# Patient Record
Sex: FEMALE | Race: WHITE | Employment: OTHER | URBAN - METROPOLITAN AREA
[De-identification: names, ages, dates, MRNs, and addresses within clinical notes are randomized per-mention and may not be internally consistent; named-entity substitution may affect disease eponyms.]

---

## 2017-01-05 ENCOUNTER — HOSPITAL ENCOUNTER (OUTPATIENT)
Dept: PHYSICAL THERAPY | Age: 77
Discharge: HOME | End: 2017-01-05
Attending: FAMILY MEDICINE
Payer: MEDICARE

## 2017-01-05 NOTE — PROGRESS NOTES
Patient did not receive PT this PM due to patient cancelled due to sick.  Brittaney Sosa, PT  1/5/2017

## 2017-01-16 ENCOUNTER — HOSPITAL ENCOUNTER (OUTPATIENT)
Dept: PHYSICAL THERAPY | Age: 77
Discharge: HOME | End: 2017-01-16
Attending: FAMILY MEDICINE
Payer: MEDICARE

## 2017-01-16 PROCEDURE — 97112 NEUROMUSCULAR REEDUCATION: CPT

## 2017-01-16 PROCEDURE — G8979 MOBILITY GOAL STATUS: HCPCS

## 2017-01-16 PROCEDURE — 97110 THERAPEUTIC EXERCISES: CPT

## 2017-01-16 PROCEDURE — G8978 MOBILITY CURRENT STATUS: HCPCS

## 2017-01-26 ENCOUNTER — HOSPITAL ENCOUNTER (OUTPATIENT)
Dept: PHYSICAL THERAPY | Age: 77
Discharge: HOME | End: 2017-01-26
Attending: FAMILY MEDICINE
Payer: MEDICARE

## 2017-01-26 PROCEDURE — 97112 NEUROMUSCULAR REEDUCATION: CPT

## 2017-01-26 PROCEDURE — 97110 THERAPEUTIC EXERCISES: CPT

## 2017-01-26 NOTE — PROGRESS NOTES
Luke Stephania   (ORE:[birth year]) [facility] Center at  [facility]  [address], [address] [place] [address],[address], [address] [facility]  [phone]   [phone]         Outpatient PHYSICAL THERAPY: Daily Note 1/26/2017  Fall Risk Score: 2 (5+ = High Risk)    ICD-10: Treatment Diagnosis: other abnormalities of gait and mobility (R26.89); Dizziness and giddiness R42  REFERRING PHYSICIAN: Martin Burleson, *  MD Orders: eval and treat  Return Physician Appointment:   MEDICAL/REFERRING DIAGNOSIS: R42 Dizziness  DATE OF ONSET: dizziness for several years   PRIOR LEVEL OF FUNCTION: independent  PRECAUTIONS/ALLERGIES:   Allergies   Allergen Reactions    Sulfa (Sulfonamide Antibiotics) Rash     ASSESSMENT:  ?????? ? ? This section established at most recent assessment??????????  1/16/17: Patient has attended 3 PT sessions from 11/30/16 to 1/16/17 for imbalance, unsteady gait, and episodes of dizziness. Patient came for appointments on 11/30/16, and 12/7/16, and now returns on 1/16/17. Patient is doing well with balance and mobility, reporting no falls. Her scores are improving with balance testing. Patient would benefit from continuing PT to address these problems to improve patient's independence and safety with mobility and daily activities. Thank you. PROBLEM LIST (Impairments causing functional limitations):  1. Decreased Strength affecting function  2. Decreased Activity tolerance    3. Imbalance  4. Unsteady gait  5. dizziness  GOALS: (Goals have been discussed and agreed upon with patient.)  SHORT-TERM FUNCTIONAL GOALS: Time Frame: 1-2 weeks  1. Patient will demonstrate independence and compliance with home exercise program to improve balance and strength for daily activities. met  2. DISCHARGE GOALS: Time Frame: 8-12 weeks  1. Patient will increase her score on the Lai Balance Scale to greater than or equal to 50/56 indicating improved safety and decreased fall risk for daily activities. MET  2. Patient will increase her score on the dynamic gait index to greater than or equal to 21/24 indicating improved balance and safety for community ambulation. Progressing and ongoing  3. Patient will report decreased dizziness to less than or equal to 1/10 with daily activities to improve safety and quality of life. Progressing and ongoing  4. Patient will increase bilateral lower extremity strength to greater than or equal to 4+/5 to improve strength for functional mobility activities. Progressing and ongoing  5. Patient will be able to step up/down a 6 inch curb safely and independently to improve mobility for daily activities. Progressing and ongoing  REHABILITATION POTENTIAL FOR STATED GOALS: Simba Mention OF CARE:  INTERVENTIONS PLANNED: (Benefits and precautions of physical therapy have been discussed with the patient.)  1. balance exercise  2. home exercise program (HEP)  3. neuromuscular re-education/strengthening  4. therapeutic activities  5. therapeutic exercise/strengthening  6. transfer training  7. Habituation and vestibular training  TREATMENT PLAN EFFECTIVE DATES: 11/30/2016 TO 2/22/2017  FREQUENCY/DURATION: Continue to follow patient 1-2 times a week for 8-12 weeks to address above goals. SUBJECTIVE:    History of Present Injury/Illness (Reason for Referral): Problem with my balance and get really dizzy sometimes. To have gallbladder surgery in the next few weeks, to be scheduled. Need handrail for steps and fell down steps one time. Last fall was many years ago, but has had a few near falls more frequently, usually from tripping. Swimmy headed feeling. Flare ups of dizziness come and go. 2 times a few weeks ago, lasting less than an hour; sat down to rest and it went away. Vision: extremely near sighted and wear contacts; hearing: WNL. Need to go to ENT but haven't been there yet. Nothing triggers the dizziness. No headaches. Does drive. Lives alone. Does gardening in warmer months.  Does grocery shopping. No dizziness with position changes in bed. I do want to mention to you that sometimes when I exercise, my O2 sats drop. I don't have O2 anymore, but I do have a sat monitor at home. Present Symptoms: No falls. Doing better. Pain Intensity 1: 0  Dominant Side: right  Past Medical History:   Past Medical History   Diagnosis Date    Adverse effect of anesthesia      \"O2 level dropped during JEANMARIE\"    Aortic stenosis      s/p AVR 2015 with Dr. Lizz Adkins    Asthma      controlled per pt     Atrial fibrillation ([address]      after AVR 2015 for short duration - no evidence of recurrence per card note 16    Chronic sinusitis     COPD (chronic obstructive pulmonary disease) ([address]      Per pulm note 11/15/16: \"Severe COPD now very well controlled on ICS/LABA, LAMA and CLINT (no use in last month, no exacerbations since last seen). Loletta Nunnery Loletta Nunnery \"    Depression     Heart murmur     High cholesterol     Hypertension     Osteoporosis     PVD (peripheral vascular disease) ([address]      per card note 16    Thyroid disease     Tinnitus of both ears     Varicose vein of leg      . Past Surgical History   Procedure Laterality Date    Pr thyroidectomy  1970    Hx urological       kidney stone    Hx  section       x3    Hx aortic valve replacement  May 2015    Hx breast lumpectomy  1970     benign       Current Medications:   Current Outpatient Prescriptions   Medication Sig    benzonatate (TESSALON) 200 mg capsule Take 1 Cap by mouth three (3) times daily as needed for Cough.  pravastatin (PRAVACHOL) 20 mg tablet Take 1 Tab by mouth nightly.  cefdinir (OMNICEF) 300 mg capsule Take 1 Cap by mouth two (2) times a day.  oxyCODONE-acetaminophen (PERCOCET) 5-325 mg per tablet Take 1-2 Tabs by mouth every four (4) hours as needed for Pain. Max Daily Amount: 12 Tabs.  ALBUTEROL IN Take  by inhalation as needed.     CHOLECALCIFEROL, VITAMIN D3, (VITAMIN D3 PO) Take  by mouth daily.    MULTIVITAMIN WITH MINERALS (HAIR,SKIN AND NAILS PO) Take  by mouth daily.  famotidine (PEPCID) 20 mg tablet Take 1 Tab by mouth daily. (Patient taking differently: Take 20 mg by mouth daily. Take / use AM day of surgery  per anesthesia protocols.)    fexofenadine-pseudoephedrine (ALLEGRA-D 24 HOUR) 180-240 mg per tablet Take 1 Tab by mouth daily. (Patient taking differently: Take 1 Tab by mouth daily. Indications: ALLERGIC RHINITIS)    fluticasone-vilanterol (BREO ELLIPTA) 100-25 mcg/dose inhaler Take 1 Puff by inhalation daily. (Patient taking differently: Take 1 Puff by inhalation daily. Take / use AM day of surgery  per anesthesia protocols.)    sertraline (ZOLOFT) 100 mg tablet Take 1 Tab by mouth daily. (Patient taking differently: Take 100 mg by mouth every evening.)    aspirin delayed-release 81 mg tablet Take 81 mg by mouth daily. Take / use AM day of surgery  per anesthesia protocols.  SPIRIVA RESPIMAT 2.5 mcg/actuation mist Take 2 Puffs by inhalation daily. Take / use AM day of surgery  per anesthesia protocols.  carvedilol (COREG) 3.125 mg tablet Take 3.125 mg by mouth two (2) times daily (with meals). Take / use AM day of surgery  per anesthesia protocols.  furosemide (LASIX) 40 mg tablet Take 40 mg by mouth daily as needed. No current facility-administered medications for this encounter. Date Last Reviewed: 1/26/17  Social History/Home Situation: lives alone. A few steps to get in the house, 2 story but does not need to go upstairs often. .     Work/Activity History: retired.   OBJECTIVE:    GROSS PRESENTATION/POSTURE: forward head posture   FUNCTIONAL MOBILITY:  · Bed Mobility: independent    · Transfers: independent    LE STRENGTH:  4/5 hip flexion, 4/5 hip abduction, 4/5 hip adduction, 4/5 hip extension, 4/5 knee extension, 4/5 knee flexion, 4/5 ankle dorsiflexion, 4/5 ankle plantarflexion  SENSATION: intact  POSTURAL CONTROL & BALANCE:  · Lai Balance Scale: 50/56.  (A score less than 45/56 indicates high risk of falls)      Comments: score at initial evaluation was 46/56  · Dynamic Gait Index:  18/24.   (A score less than or equal to 19/24 is abnormal and predictive of falls)      Comments: score at initial evaluation was 17/24  · Smart Equitest Sensory Organization Test:  Objective findings indicate Normal use of somatosensory, Normal use of visual, & Decreased use of vestibular inputs to balance. Center of gravity is shifted anteriorly. See scanned report. · Smart Equitest Limits of Stability Test: NT  · Other Smart Equitest Testing:  NT  OCULOMOTOR EXAM:  · Eye Range of Motion:  full range of motion  · Cervical Range of Motion:   diminished range of motion  · Spontaneous nystagmus:  NO  · Gaze holding nystagmus:  NO  · Skew Deviation:  none  · Smooth Pursuit:      [x]Smooth Eye Movements    []Delayed Eye Movements     []Within Normal Limits     []Other(comment): · Voluntary Saccades:      [x]Smooth Eye Movements    []Delayed Eye Movements     []Within Normal Limits     []Other(comment): · Optokinetic cloth: NT  VESTIBULAR OCULAR REFLEX TESTING:  · Dynamic Visual Acuity Test: NT  · Head Thrust Test: Negative to the right. Negative to the left. · VOR Cancellation: WNL  POSITION TESTS:  · Hallpike-Mantee testing: NT due to patient subjectively reports no dizziness with rolling over or sitting up/lying down. · Motion Sensitivity Quotient: NT  QUALITY OF GAIT: Patient ambulates with no AD, with decreased pace and decreased step length, wide OWEN. OBJECTIVE MEASURES:   Tool Used: Dynamic Gait Index  Score:  Initial: 17/24 Most Recent: 18/24 (Date: 1/16/17 )   Interpretation of Score: Each section is scored on a 0-3 scale, 0 representing the patients inability to perform the task and 3 representing independence. The scores of each section are added together for a total score of 24. Any score below 19 indicates increased risk for falls.   Score 24 23-19 18-15 14-10 9-5 4-1 0   Modifier CH CI CJ CK CL CM CN     ? Mobility - Walking and Moving Around:     - CURRENT STATUS: CJ - 20%-39% impaired, limited or restricted    - GOAL STATUS:  CI - 1%-19% impaired, limited or restricted    - D/C STATUS:  ---------------To be determined---------------    TREATMENT:    (In addition to Assessment/Re-Assessment sessions the following treatments were rendered)        Neuromuscular Re-education (30 Minutes):  Exercise/activities per grid below to improve balance, coordination, posture and proprioception. Required minimal verbal cues to promote static and dynamic balance in standing.   Balance/Vestibular Treatment:   Activity   Date  12/7/16   Date  1/16/17 Date  1/26/17 Date Date Date   Activity/Exercise   Sets/reps/equipment Sets/reps/  equipment Sets/reps/  equipment Sets/reps/  equipment Sets/reps/  equipment Sets/reps/  equipment   Walking with head turns     4 laps in hallway 4 laps in hallway 4 laps in hallway      Walking with head up & down     4 laps in hallway 4 laps in hallway 4 laps in hallway      Step ups     6 inch step x 10 reps each leg with rail  6 inch step x 10 reps each leg with rail      Step taps     6 inch step x 30 reps 6 inch step x 30 reps 6 inch step x 30 reps      Marching   4 laps in hallway 4 laps in hallway 4 laps in hallway      Sidestepping   4 laps in hallway 4 laps in hallway 4 laps in hallway      Step overs   Over 1/2 foam roll x 10 reps each leg Over 1/2 foam roll x 15 reps each leg Over 1/2 foam roll x 15 reps each leg      Dawn           Walking  backwards     4 laps in hallway  4 laps in hallway      Tandem walking           Weaving in/out of cones     4 laps in hallway  4 laps in hallway      Picking up cones             Sports cord             PadProof           Kick ball           Figure 8s            Circles right/left           Walking with 360 degree turns           Spirals           Weight shifting:    Left & Right     Blue foams eyes open Blue foams eyes open Blue foams eyes open      Weight shifting: Forward & Backward      Blue foams eyes open Blue foams eyes open Blue foams eyes open      Static Standing Balance     Blue foams eyes open and closed Blue foams eyes open and closed Blue foams eyes open and closed      Standing with feet apart     Blue foams eyes open with head turns and up/down Blue foams eyes open with head turns and up/down Blue foams eyes open with head turns and up/down      Standing with feet together             Standing with feet semitandem   Blue foams eyes open and closed Blue foams eyes open and closed Blue foams eyes open and closed      Standing with feet tandem           Single leg stance           X1/X2 Viewing exercises             Hallpike-Jose David testing for BPPV (Benign Paroxysmal Positional Vertigo)             Vasquez-Daroff exercises           Canalith Repositioning treatment/Epley Maneuver  for BPPV (Benign Paroxysmal Positional Vertigo)           Smart Equitest Training: See scanned report. Therapeutic Exercise: (10 Minutes):  Exercises per grid below to improve mobility, strength and balance. Required minimal verbal cues to promote proper body alignment, promote proper body posture and promote proper body mechanics. Progressed resistance, repetitions and complexity of movement as indicated. Date:  1/16/17 Date:  1/26/17 Date:     Activity/Exercise Parameters Parameters Parameters   nustep Level 3 x 6 minutes Level 3 x 6 minutes    Sit to stand From mat table x 10 reps From mat table x 10 reps                                      HEP: handouts given to patient for balance exercises. _____________________________________________________  Treatment Assessment:  Patient tolerated exercises without complaints. Continue to progress as tolerated.   Progression/Medical Necessity:   · Patient is expected to demonstrate progress in strength, balance and dizziness to improve safety during daily activities. Compliance with Program/Exercises: compliant  Reason for Continuation of Services/Other Comments:  · Patient continues to demonstrate capacity to improve strength, balance, dizziness which will increase independence and increase safety. Recommendations/Intent for next treatment session: \"Treatment next visit will focus on advancements to more challenging activities\".     Total Treatment Duration:  PT Patient Time In/Time Out  Time In: 1350  Time Out: [address]

## 2017-02-02 ENCOUNTER — HOSPITAL ENCOUNTER (OUTPATIENT)
Dept: PHYSICAL THERAPY | Age: 77
Discharge: HOME | End: 2017-02-02
Attending: FAMILY MEDICINE
Payer: MEDICARE

## 2017-02-02 PROCEDURE — 97112 NEUROMUSCULAR REEDUCATION: CPT

## 2017-02-02 PROCEDURE — 97110 THERAPEUTIC EXERCISES: CPT

## 2017-02-02 NOTE — PROGRESS NOTES
Frankie Kwan   (KDF:[id number]) Therapy Center at  [facility], [address] [address],[address],   [place] [address].  [phone]   [phone]         Outpatient PHYSICAL THERAPY: Daily Note 2/2/2017  Fall Risk Score: 2 (5+ = High Risk)    ICD-10: Treatment Diagnosis: other abnormalities of gait and mobility (R26.89); Dizziness and giddiness R42  REFERRING PHYSICIAN: Teresa Huitron MD Orders: eval and treat  Return Physician Appointment:   MEDICAL/REFERRING DIAGNOSIS: R42 Dizziness  DATE OF ONSET: dizziness for several years   PRIOR LEVEL OF FUNCTION: independent  PRECAUTIONS/ALLERGIES:   Allergies   Allergen Reactions    Sulfa (Sulfonamide Antibiotics) Rash     ASSESSMENT:  ?????? ? ? This section established at most recent assessment??????????  1/16/17: Patient has attended 3 PT sessions from 11/30/16 to 1/16/17 for imbalance, unsteady gait, and episodes of dizziness. Patient came for appointments on 11/30/16, and 12/7/16, and now returns on 1/16/17. Patient is doing well with balance and mobility, reporting no falls. Her scores are improving with balance testing. Patient would benefit from continuing PT to address these problems to improve patient's independence and safety with mobility and daily activities. Thank you. PROBLEM LIST (Impairments causing functional limitations):  1. Decreased Strength affecting function  2. Decreased Activity tolerance    3. Imbalance  4. Unsteady gait  5. dizziness  GOALS: (Goals have been discussed and agreed upon with patient.)  SHORT-TERM FUNCTIONAL GOALS: Time Frame: 1-2 weeks  1. Patient will demonstrate independence and compliance with home exercise program to improve balance and strength for daily activities. met  2. DISCHARGE GOALS: Time Frame: 8-12 weeks  1. Patient will increase her score on the Lai Balance Scale to greater than or equal to 50/56 indicating improved safety and decreased fall risk for daily activities. MET  2. Patient will increase her score on the dynamic gait index to greater than or equal to 21/24 indicating improved balance and safety for community ambulation. Progressing and ongoing  3. Patient will report decreased dizziness to less than or equal to 1/10 with daily activities to improve safety and quality of life. Progressing and ongoing  4. Patient will increase bilateral lower extremity strength to greater than or equal to 4+/5 to improve strength for functional mobility activities. Progressing and ongoing  5. Patient will be able to step up/down a 6 inch curb safely and independently to improve mobility for daily activities. Progressing and ongoing  REHABILITATION POTENTIAL FOR STATED GOALS: [facility] CARE:  INTERVENTIONS PLANNED: (Benefits and precautions of physical therapy have been discussed with the patient.)  1. balance exercise  2. home exercise program (HEP)  3. neuromuscular re-education/strengthening  4. therapeutic activities  5. therapeutic exercise/strengthening  6. transfer training  7. Habituation and vestibular training  TREATMENT PLAN EFFECTIVE DATES: 11/30/2016 TO 2/22/2017  FREQUENCY/DURATION: Continue to follow patient 1-2 times a week for 8-12 weeks to address above goals. SUBJECTIVE:    History of Present Injury/Illness (Reason for Referral): Problem with my balance and get really dizzy sometimes. To have gallbladder surgery in the next few weeks, to be scheduled. Need handrail for steps and fell down steps one time. Last fall was many years ago, but has had a few near falls more frequently, usually from tripping. Swimmy headed feeling. Flare ups of dizziness come and go. 2 times a few weeks ago, lasting less than an hour; sat down to rest and it went away. Vision: extremely near sighted and wear contacts; hearing: WNL. Need to go to ENT but haven't been there yet. Nothing triggers the dizziness. No headaches. Does drive. Lives alone. Does gardening in warmer months.  Does grocery shopping. No dizziness with position changes in bed. I do want to mention to you that sometimes when I exercise, my O2 sats drop. I don't have O2 anymore, but I do have a sat monitor at home. Present Symptoms: \"Doing well. No problems. \"  Pain Intensity 1: 0  Dominant Side: right  Past Medical History:   Past Medical History   Diagnosis Date    Adverse effect of anesthesia      \"O2 level dropped during JEANMARIE\"    Aortic stenosis      s/p AVR 2015 with Dr. Kimmy King    Asthma      controlled per pt     Atrial fibrillation ([address]      after AVR 2015 for short duration - no evidence of recurrence per card note 16    Chronic sinusitis     COPD (chronic obstructive pulmonary disease) ([address]      Per pulm note 11/15/16: \"Severe COPD now very well controlled on ICS/LABA, LAMA and CLINT (no use in last month, no exacerbations since last seen). Rakan Reasoner Rakan Reasoner \"    Depression     Heart murmur     High cholesterol     Hypertension     Osteoporosis     PVD (peripheral vascular disease) ([address]      per card note 16    Thyroid disease     Tinnitus of both ears     Varicose vein of leg      . Past Surgical History   Procedure Laterality Date    Pr thyroidectomy  1970    Hx urological       kidney stone    Hx  section       x3    Hx aortic valve replacement  May 2015    Hx breast lumpectomy  1970     benign       Current Medications:   Current Outpatient Prescriptions   Medication Sig    benzonatate (TESSALON) 200 mg capsule Take 1 Cap by mouth three (3) times daily as needed for Cough.  pravastatin (PRAVACHOL) 20 mg tablet Take 1 Tab by mouth nightly.  cefdinir (OMNICEF) 300 mg capsule Take 1 Cap by mouth two (2) times a day.  oxyCODONE-acetaminophen (PERCOCET) 5-325 mg per tablet Take 1-2 Tabs by mouth every four (4) hours as needed for Pain. Max Daily Amount: 12 Tabs.  ALBUTEROL IN Take  by inhalation as needed.     CHOLECALCIFEROL, VITAMIN D3, (VITAMIN D3 PO) Take  by mouth daily.    MULTIVITAMIN WITH MINERALS (HAIR,SKIN AND NAILS PO) Take  by mouth daily.  famotidine (PEPCID) 20 mg tablet Take 1 Tab by mouth daily. (Patient taking differently: Take 20 mg by mouth daily. Take / use AM day of surgery  per anesthesia protocols.)    fexofenadine-pseudoephedrine (ALLEGRA-D 24 HOUR) 180-240 mg per tablet Take 1 Tab by mouth daily. (Patient taking differently: Take 1 Tab by mouth daily. Indications: ALLERGIC RHINITIS)    fluticasone-vilanterol (BREO ELLIPTA) 100-25 mcg/dose inhaler Take 1 Puff by inhalation daily. (Patient taking differently: Take 1 Puff by inhalation daily. Take / use AM day of surgery  per anesthesia protocols.)    sertraline (ZOLOFT) 100 mg tablet Take 1 Tab by mouth daily. (Patient taking differently: Take 100 mg by mouth every evening.)    aspirin delayed-release 81 mg tablet Take 81 mg by mouth daily. Take / use AM day of surgery  per anesthesia protocols.  SPIRIVA RESPIMAT 2.5 mcg/actuation mist Take 2 Puffs by inhalation daily. Take / use AM day of surgery  per anesthesia protocols.  carvedilol (COREG) 3.125 mg tablet Take 3.125 mg by mouth two (2) times daily (with meals). Take / use AM day of surgery  per anesthesia protocols.  furosemide (LASIX) 40 mg tablet Take 40 mg by mouth daily as needed. No current facility-administered medications for this encounter. Date Last Reviewed: 2/2/17  Social History/Home Situation: lives alone. A few steps to get in the house, 2 story but does not need to go upstairs often. .     Work/Activity History: retired.   OBJECTIVE:    GROSS PRESENTATION/POSTURE: forward head posture   FUNCTIONAL MOBILITY:  · Bed Mobility: independent    · Transfers: independent    LE STRENGTH:  4/5 hip flexion, 4/5 hip abduction, 4/5 hip adduction, 4/5 hip extension, 4/5 knee extension, 4/5 knee flexion, 4/5 ankle dorsiflexion, 4/5 ankle plantarflexion  SENSATION: intact  POSTURAL CONTROL & BALANCE:  · Lai Balance Scale: 50/56.  (A score less than 45/56 indicates high risk of falls)      Comments: score at initial evaluation was 46/56  · Dynamic Gait Index:  18/24.   (A score less than or equal to 19/24 is abnormal and predictive of falls)      Comments: score at initial evaluation was 17/24  · Smart Equitest Sensory Organization Test:  Objective findings indicate Normal use of somatosensory, Normal use of visual, & Decreased use of vestibular inputs to balance. Center of gravity is shifted anteriorly. See scanned report. · Smart Equitest Limits of Stability Test: NT  · Other Smart Equitest Testing:  NT  OCULOMOTOR EXAM:  · Eye Range of Motion:  full range of motion  · Cervical Range of Motion:   diminished range of motion  · Spontaneous nystagmus:  NO  · Gaze holding nystagmus:  NO  · Skew Deviation:  none  · Smooth Pursuit:      [x]Smooth Eye Movements    []Delayed Eye Movements     []Within Normal Limits     []Other(comment): · Voluntary Saccades:      [x]Smooth Eye Movements    []Delayed Eye Movements     []Within Normal Limits     []Other(comment): · Optokinetic cloth: NT  VESTIBULAR OCULAR REFLEX TESTING:  · Dynamic Visual Acuity Test: NT  · Head Thrust Test: Negative to the right. Negative to the left. · VOR Cancellation: WNL  POSITION TESTS:  · Hallpike-Street testing: NT due to patient subjectively reports no dizziness with rolling over or sitting up/lying down. · Motion Sensitivity Quotient: NT  QUALITY OF GAIT: Patient ambulates with no AD, with decreased pace and decreased step length, wide OWEN. OBJECTIVE MEASURES:   Tool Used: Dynamic Gait Index  Score:  Initial: 17/24 Most Recent: 18/24 (Date: 1/16/17 )   Interpretation of Score: Each section is scored on a 0-3 scale, 0 representing the patients inability to perform the task and 3 representing independence. The scores of each section are added together for a total score of 24. Any score below 19 indicates increased risk for falls.   Score 24 23-19 18-15 14-10 9-5 4-1 0   Modifier CH CI CJ CK CL CM CN     ? Mobility - Walking and Moving Around:     - CURRENT STATUS: CJ - 20%-39% impaired, limited or restricted    - GOAL STATUS:  CI - 1%-19% impaired, limited or restricted    - D/C STATUS:  ---------------To be determined---------------    TREATMENT:    (In addition to Assessment/Re-Assessment sessions the following treatments were rendered)        Neuromuscular Re-education (25 Minutes):  Exercise/activities per grid below to improve balance, coordination, posture and proprioception. Required minimal verbal cues to promote static and dynamic balance in standing.   Balance/Vestibular Treatment:   Activity   Date  12/7/16   Date  1/16/17 Date  1/26/17 Date  2/2/17 Date Date   Activity/Exercise   Sets/reps/equipment Sets/reps/  equipment Sets/reps/  equipment Sets/reps/  equipment Sets/reps/  equipment Sets/reps/  equipment   Walking with head turns     4 laps in hallway 4 laps in hallway 4 laps in hallway 4 laps in hallway     Walking with head up & down     4 laps in hallway 4 laps in hallway 4 laps in hallway 4 laps in hallway     Step ups     6 inch step x 10 reps each leg with rail  6 inch step x 10 reps each leg with rail 6 inch step x 10 reps each leg with rail     Step taps     6 inch step x 30 reps 6 inch step x 30 reps 6 inch step x 30 reps 6 inch step x 30 reps     Marching   4 laps in hallway 4 laps in hallway 4 laps in hallway 4 laps in hallway     Sidestepping   4 laps in hallway 4 laps in hallway 4 laps in hallway 4 laps in hallway     Step overs   Over 1/2 foam roll x 10 reps each leg Over 1/2 foam roll x 15 reps each leg Over 1/2 foam roll x 15 reps each leg Over 1/2 foam roll x 15 reps each leg     [place]           Walking  backwards     4 laps in hallway  4 laps in hallway 4 laps in hallway     Tandem walking           Weaving in/out of cones     4 laps in hallway  4 laps in hallway 4 laps in hallway     Picking up cones Sports cord             [employer] ball           Figure 8s            Circles right/left           Walking with 360 degree turns           Spirals           Weight shifting:    Left & Right     Blue foams eyes open Blue foams eyes open Blue foams eyes open Yellow foam eyes open      Weight shifting: Forward & Backward      Blue foams eyes open Blue foams eyes open Blue foams eyes open Yellow foam eyes open      Static Standing Balance     Blue foams eyes open and closed Blue foams eyes open and closed Blue foams eyes open and closed Yellow foam eyes open  and closed     Standing with feet apart     Blue foams eyes open with head turns and up/down Blue foams eyes open with head turns and up/down Blue foams eyes open with head turns and up/down Yellow foam eyes open with head turns and head up/down     Standing with feet together             Standing with feet semitandem   Blue foams eyes open and closed Blue foams eyes open and closed Blue foams eyes open and closed Blue foams eyes open and closed     Standing with feet tandem           Single leg stance           X1/X2 Viewing exercises             Hallpike-Jose David testing for BPPV (Benign Paroxysmal Positional Vertigo)             Vasquez-Daroff exercises           Canalith Repositioning treatment/Epley Maneuver  for BPPV (Benign Paroxysmal Positional Vertigo)           Smart Equitest Training: See scanned report. Therapeutic Exercise: (15 Minutes):  Exercises per grid below to improve mobility, strength and balance. Required minimal verbal cues to promote proper body alignment, promote proper body posture and promote proper body mechanics. Progressed resistance, repetitions and complexity of movement as indicated.    Date:  1/16/17 Date:  1/26/17 Date:  2/2/17   Activity/Exercise Parameters Parameters Parameters   nustep Level 3 x 6 minutes Level 3 x 6 minutes Level 3 x 10 minutes   Sit to stand From mat table x 10 reps From mat table x 10 reps From mat table 2 x 10 reps                                     HEP: handouts given to patient for balance exercises. _____________________________________________________  Treatment Assessment:  Patient tolerated exercises without complaints. Patient's balance is improving. Continue to progress as tolerated. Progression/Medical Necessity:   · Patient is expected to demonstrate progress in strength, balance and dizziness to improve safety during daily activities. Compliance with Program/Exercises: compliant  Reason for Continuation of Services/Other Comments:  · Patient continues to demonstrate capacity to improve strength, balance, dizziness which will increase independence and increase safety. Recommendations/Intent for next treatment session: \"Treatment next visit will focus on advancements to more challenging activities\".     Total Treatment Duration:  PT Patient Time In/Time Out  Time In: 1305  Time Out: Maximilian Pradhan  [place]

## 2017-02-09 ENCOUNTER — HOSPITAL ENCOUNTER (OUTPATIENT)
Dept: PHYSICAL THERAPY | Age: 77
Discharge: HOME | End: 2017-02-09
Attending: FAMILY MEDICINE
Payer: MEDICARE

## 2017-02-09 PROCEDURE — 97110 THERAPEUTIC EXERCISES: CPT

## 2017-02-09 PROCEDURE — 97112 NEUROMUSCULAR REEDUCATION: CPT

## 2017-02-09 NOTE — PROGRESS NOTES
Lola Wilson   (NDR:[id number]) [address]  at  [facility]  [address], [address],   [address]  [phone]   [phone]         Outpatient PHYSICAL THERAPY: Daily Note 2/9/2017  Fall Risk Score: 2 (5+ = High Risk)    ICD-10: Treatment Diagnosis: other abnormalities of gait and mobility (R26.89); Dizziness and giddiness R42  REFERRING PHYSICIAN: Delfino Hashimoto, * MD Orders: eval and treat  Return Physician Appointment:   MEDICAL/REFERRING DIAGNOSIS: R42 Dizziness  DATE OF ONSET: dizziness for several years   PRIOR LEVEL OF FUNCTION: independent  PRECAUTIONS/ALLERGIES:   Allergies   Allergen Reactions    Sulfa (Sulfonamide Antibiotics) Rash     ASSESSMENT:  ?????? ? ? This section established at most recent assessment??????????  1/16/17: Patient has attended 3 PT sessions from 11/30/16 to 1/16/17 for imbalance, unsteady gait, and episodes of dizziness. Patient came for appointments on 11/30/16, and 12/7/16, and now returns on 1/16/17. Patient is doing well with balance and mobility, reporting no falls. Her scores are improving with balance testing. Patient would benefit from continuing PT to address these problems to improve patient's independence and safety with mobility and daily activities. Thank you. PROBLEM LIST (Impairments causing functional limitations):  1. Decreased Strength affecting function  2. Decreased Activity tolerance    3. Imbalance  4. Unsteady gait  5. dizziness  GOALS: (Goals have been discussed and agreed upon with patient.)  SHORT-TERM FUNCTIONAL GOALS: Time Frame: 1-2 weeks  1. Patient will demonstrate independence and compliance with home exercise program to improve balance and strength for daily activities. met  2. DISCHARGE GOALS: Time Frame: 8-12 weeks  1. Patient will increase her score on the Lai Balance Scale to greater than or equal to 50/56 indicating improved safety and decreased fall risk for daily activities. MET  2. Patient will increase her score on the dynamic gait index to greater than or equal to 21/24 indicating improved balance and safety for community ambulation. Progressing and ongoing  3. Patient will report decreased dizziness to less than or equal to 1/10 with daily activities to improve safety and quality of life. Progressing and ongoing  4. Patient will increase bilateral lower extremity strength to greater than or equal to 4+/5 to improve strength for functional mobility activities. Progressing and ongoing  5. Patient will be able to step up/down a 6 inch curb safely and independently to improve mobility for daily activities. Progressing and ongoing  REHABILITATION POTENTIAL FOR STATED GOALS: Navya Penaloza OF CARE:  INTERVENTIONS PLANNED: (Benefits and precautions of physical therapy have been discussed with the patient.)  1. balance exercise  2. home exercise program (HEP)  3. neuromuscular re-education/strengthening  4. therapeutic activities  5. therapeutic exercise/strengthening  6. transfer training  7. Habituation and vestibular training  TREATMENT PLAN EFFECTIVE DATES: 11/30/2016 TO 2/22/2017  FREQUENCY/DURATION: Continue to follow patient 1-2 times a week for 8-12 weeks to address above goals. SUBJECTIVE:    History of Present Injury/Illness (Reason for Referral): Problem with my balance and get really dizzy sometimes. To have gallbladder surgery in the next few weeks, to be scheduled. Need handrail for steps and fell down steps one time. Last fall was many years ago, but has had a few near falls more frequently, usually from tripping. Swimmy headed feeling. Flare ups of dizziness come and go. 2 times a few weeks ago, lasting less than an hour; sat down to rest and it went away. Vision: extremely near sighted and wear contacts; hearing: WNL. Need to go to ENT but haven't been there yet. Nothing triggers the dizziness. No headaches. Does drive. Lives alone. Does gardening in warmer months.  Does grocery shopping. No dizziness with position changes in bed. I do want to mention to you that sometimes when I exercise, my O2 sats drop. I don't have O2 anymore, but I do have a sat monitor at home. Present Symptoms: \"doing well. I accidentally missed my appt on Monday, bc I didn't realize I scheduled one on Monday. No falls. \"  Pain Intensity 1: 0  Dominant Side: right  Past Medical History:   Past Medical History   Diagnosis Date    Adverse effect of anesthesia      \"O2 level dropped during JEANMARIE\"    Aortic stenosis      s/p AVR 2015 with Dr. Syed Adams    Asthma      controlled per pt     Atrial fibrillation ([address]      after AVR 2015 for short duration - no evidence of recurrence per card note 16    Chronic sinusitis     COPD (chronic obstructive pulmonary disease) [address] [address]      Per pulm note 11/15/16: \"Severe COPD now very well controlled on ICS/LABA, LAMA and CLINT (no use in last month, no exacerbations since last seen). Isabell Riser Isabell Riser \"    Depression     Heart murmur     High cholesterol     Hypertension     Osteoporosis     PVD (peripheral vascular disease) ([facility] [address]      per card note 16    Thyroid disease     Tinnitus of both ears     Varicose vein of leg      . Past Surgical History   Procedure Laterality Date    Pr thyroidectomy      Hx urological       kidney stone    Hx  section       x3    Hx aortic valve replacement  May 2015    Hx breast lumpectomy  1970     benign       Current Medications:   Current Outpatient Prescriptions   Medication Sig    benzonatate (TESSALON) 200 mg capsule Take 1 Cap by mouth three (3) times daily as needed for Cough.  pravastatin (PRAVACHOL) 20 mg tablet Take 1 Tab by mouth nightly.  cefdinir (OMNICEF) 300 mg capsule Take 1 Cap by mouth two (2) times a day.  oxyCODONE-acetaminophen (PERCOCET) 5-325 mg per tablet Take 1-2 Tabs by mouth every four (4) hours as needed for Pain. Max Daily Amount: 12 Tabs.     ALBUTEROL IN Take  by inhalation as needed.  CHOLECALCIFEROL, VITAMIN D3, (VITAMIN D3 PO) Take  by mouth daily.  MULTIVITAMIN WITH MINERALS (HAIR,SKIN AND NAILS PO) Take  by mouth daily.  famotidine (PEPCID) 20 mg tablet Take 1 Tab by mouth daily. (Patient taking differently: Take 20 mg by mouth daily. Take / use AM day of surgery  per anesthesia protocols.)    fexofenadine-pseudoephedrine (ALLEGRA-D 24 HOUR) 180-240 mg per tablet Take 1 Tab by mouth daily. (Patient taking differently: Take 1 Tab by mouth daily. Indications: ALLERGIC RHINITIS)    fluticasone-vilanterol (BREO ELLIPTA) 100-25 mcg/dose inhaler Take 1 Puff by inhalation daily. (Patient taking differently: Take 1 Puff by inhalation daily. Take / use AM day of surgery  per anesthesia protocols.)    sertraline (ZOLOFT) 100 mg tablet Take 1 Tab by mouth daily. (Patient taking differently: Take 100 mg by mouth every evening.)    aspirin delayed-release 81 mg tablet Take 81 mg by mouth daily. Take / use AM day of surgery  per anesthesia protocols.  SPIRIVA RESPIMAT 2.5 mcg/actuation mist Take 2 Puffs by inhalation daily. Take / use AM day of surgery  per anesthesia protocols.  carvedilol (COREG) 3.125 mg tablet Take 3.125 mg by mouth two (2) times daily (with meals). Take / use AM day of surgery  per anesthesia protocols.  furosemide (LASIX) 40 mg tablet Take 40 mg by mouth daily as needed. No current facility-administered medications for this encounter. Date Last Reviewed: 2/9/17  Social History/Home Situation: lives alone. A few steps to get in the house, 2 story but does not need to go upstairs often. .     Work/Activity History: retired.   OBJECTIVE:    GROSS PRESENTATION/POSTURE: forward head posture   FUNCTIONAL MOBILITY:  · Bed Mobility: independent    · Transfers: independent    LE STRENGTH:  4/5 hip flexion, 4/5 hip abduction, 4/5 hip adduction, 4/5 hip extension, 4/5 knee extension, 4/5 knee flexion, 4/5 ankle dorsiflexion, 4/5 ankle plantarflexion  SENSATION: intact  POSTURAL CONTROL & BALANCE:  · Lai Balance Scale:  50/56.  (A score less than 45/56 indicates high risk of falls)      Comments: score at initial evaluation was 46/56  · Dynamic Gait Index:  18/24.   (A score less than or equal to 19/24 is abnormal and predictive of falls)      Comments: score at initial evaluation was 17/24  · Smart Equitest Sensory Organization Test:  Objective findings indicate Normal use of somatosensory, Normal use of visual, & Decreased use of vestibular inputs to balance. Center of gravity is shifted anteriorly. See scanned report. · Smart Equitest Limits of Stability Test: NT  · Other Smart Equitest Testing:  NT  OCULOMOTOR EXAM:  · Eye Range of Motion:  full range of motion  · Cervical Range of Motion:   diminished range of motion  · Spontaneous nystagmus:  NO  · Gaze holding nystagmus:  NO  · Skew Deviation:  none  · Smooth Pursuit:      [x]Smooth Eye Movements    []Delayed Eye Movements     []Within Normal Limits     []Other(comment): · Voluntary Saccades:      [x]Smooth Eye Movements    []Delayed Eye Movements     []Within Normal Limits     []Other(comment): · Optokinetic cloth: NT  VESTIBULAR OCULAR REFLEX TESTING:  · Dynamic Visual Acuity Test: NT  · Head Thrust Test: Negative to the right. Negative to the left. · VOR Cancellation: WNL  POSITION TESTS:  · Hallpike-Jose David testing: NT due to patient subjectively reports no dizziness with rolling over or sitting up/lying down. · Motion Sensitivity Quotient: NT  QUALITY OF GAIT: Patient ambulates with no AD, with decreased pace and decreased step length, wide OWEN. OBJECTIVE MEASURES:   Tool Used: Dynamic Gait Index  Score:  Initial: 17/24 Most Recent: 18/24 (Date: 1/16/17 )   Interpretation of Score: Each section is scored on a 0-3 scale, 0 representing the patients inability to perform the task and 3 representing independence.   The scores of each section are added together for a total score of 24.  Any score below 19 indicates increased risk for falls. Score 24 23-19 18-15 14-10 9-5 4-1 0   Modifier CH CI CJ CK CL CM CN     ? Mobility - Walking and Moving Around:     - CURRENT STATUS: CJ - 20%-39% impaired, limited or restricted    - GOAL STATUS:  CI - 1%-19% impaired, limited or restricted    - D/C STATUS:  ---------------To be determined---------------    TREATMENT:    (In addition to Assessment/Re-Assessment sessions the following treatments were rendered)        Neuromuscular Re-education (30 Minutes):  Exercise/activities per grid below to improve balance, coordination, posture and proprioception. Required minimal verbal cues to promote static and dynamic balance in standing.   Balance/Vestibular Treatment:   Activity   Date  12/7/16   Date  1/16/17 Date  1/26/17 Date  2/2/17 Date  2/9/17 Date   Activity/Exercise   Sets/reps/equipment Sets/reps/  equipment Sets/reps/  equipment Sets/reps/  equipment Sets/reps/  equipment Sets/reps/  equipment   Walking with head turns     4 laps in hallway 4 laps in hallway 4 laps in hallway 4 laps in hallway 4 laps in hallway    Walking with head up & down     4 laps in hallway 4 laps in hallway 4 laps in hallway 4 laps in hallway 4 laps in hallway    Step ups     6 inch step x 10 reps each leg with rail  6 inch step x 10 reps each leg with rail 6 inch step x 10 reps each leg with rail 6 inch step x 10 reps each leg with rail    Step taps     6 inch step x 30 reps 6 inch step x 30 reps 6 inch step x 30 reps 6 inch step x 30 reps 6 inch step x 30 reps    Marching   4 laps in hallway 4 laps in hallway 4 laps in hallway 4 laps in hallway 4 laps in hallway    Sidestepping   4 laps in hallway 4 laps in hallway 4 laps in hallway 4 laps in hallway 4 laps in hallway    Step overs   Over 1/2 foam roll x 10 reps each leg Over 1/2 foam roll x 15 reps each leg Over 1/2 foam roll x 15 reps each leg Over 1/2 foam roll x 15 reps each leg Over 1/2 foam roll x 15 reps each leg    [place]           Walking  backwards     4 laps in hallway  4 laps in hallway 4 laps in hallway 4 laps in hallway    Tandem walking           Weaving in/out of cones     4 laps in hallway  4 laps in hallway 4 laps in hallway 4 laps in hallway    Picking up cones             Sports cord             [employer]           Kick ball           Figure 8s            Circles right/left           Walking with 360 degree turns           Spirals           Weight shifting:    Left & Right     Blue foams eyes open Blue foams eyes open Blue foams eyes open Yellow foam eyes open  Yellow foam eyes open     Weight shifting: Forward & Backward      Blue foams eyes open Blue foams eyes open Blue foams eyes open Yellow foam eyes open  Yellow foam eyes open     Static Standing Balance     Blue foams eyes open and closed Blue foams eyes open and closed Blue foams eyes open and closed Yellow foam eyes open  and closed Yellow foam eyes open  and closed    Standing with feet apart     Blue foams eyes open with head turns and up/down Blue foams eyes open with head turns and up/down Blue foams eyes open with head turns and up/down Yellow foam eyes open with head turns and head up/down Yellow foam eyes open with head turns and head up/down    Standing with feet together             Standing with feet semitandem   Blue foams eyes open and closed Blue foams eyes open and closed Blue foams eyes open and closed Blue foams eyes open and closed Blue foams eyes open and closed    Standing with feet tandem           Single leg stance           X1/X2 Viewing exercises             Hallpike-Jose David testing for BPPV (Benign Paroxysmal Positional Vertigo)             Vasquez-Lydia exercises           Canalith Repositioning treatment/Epley Maneuver  for BPPV (Benign Paroxysmal Positional Vertigo)           Smart Equitest Training: See scanned report.                 Therapeutic Exercise: (15 Minutes):  Exercises per grid below to improve mobility, strength and balance. Required minimal verbal cues to promote proper body alignment, promote proper body posture and promote proper body mechanics. Progressed resistance, repetitions and complexity of movement as indicated. Date:  1/16/17 Date:  1/26/17 Date:  2/2/17 Date   2/9/17   Activity/Exercise Parameters Parameters Parameters    nustep Level 3 x 6 minutes Level 3 x 6 minutes Level 3 x 10 minutes Level 3 x 8 minutes   Sit to stand From mat table x 10 reps From mat table x 10 reps From mat table 2 x 10 reps From mat table 2 x 10 reps                                          HEP: handouts given to patient for balance exercises. _____________________________________________________  Treatment Assessment:  Patient tolerated exercises without complaints. Patient reported no pain or dizziness with exercises. Balance is improving. Continue to progress as tolerated. Progression/Medical Necessity:   · Patient is expected to demonstrate progress in strength, balance and dizziness to improve safety during daily activities. Compliance with Program/Exercises: compliant  Reason for Continuation of Services/Other Comments:  · Patient continues to demonstrate capacity to improve strength, balance, dizziness which will increase independence and increase safety. Recommendations/Intent for next treatment session: \"Treatment next visit will focus on advancements to more challenging activities\".     Total Treatment Duration:  PT Patient Time In/Time Out  Time In: 1300  Time Out: Maxim[address] Pr[address]han [address]

## 2017-02-13 ENCOUNTER — HOSPITAL ENCOUNTER (OUTPATIENT)
Dept: PHYSICAL THERAPY | Age: 77
Discharge: HOME | End: 2017-02-13
Attending: FAMILY MEDICINE
Payer: MEDICARE

## 2017-02-13 NOTE — PROGRESS NOTES
Patient did not receive PT this PM due to cancelled due to problems with heat pump .   Elisabet Magana, PT  2/13/2017

## 2017-02-16 ENCOUNTER — HOSPITAL ENCOUNTER (OUTPATIENT)
Dept: PHYSICAL THERAPY | Age: 77
Discharge: HOME | End: 2017-02-16
Attending: FAMILY MEDICINE
Payer: MEDICARE

## 2017-02-16 PROCEDURE — 97112 NEUROMUSCULAR REEDUCATION: CPT

## 2017-02-16 NOTE — PROGRESS NOTES
Lasalle Sandifer   (HBD:8/3/7804) Therapy Center at  [facility] [address], [address] [facility] [address],   [facility] [address].  [phone]   [phone]         Outpatient PHYSICAL THERAPY: Daily Note 2/16/2017  Fall Risk Score: 2 (5+ = High Risk)    ICD-10: Treatment Diagnosis: other abnormalities of gait and mobility (R26.89); Dizziness and giddiness R42  REFERRING PHYSICIAN: Teresa Arora MD Orders: eval and treat  Return Physician Appointment:   MEDICAL/REFERRING DIAGNOSIS: R42 Dizziness  DATE OF ONSET: dizziness for several years   PRIOR LEVEL OF FUNCTION: independent  PRECAUTIONS/ALLERGIES:   Allergies   Allergen Reactions    Sulfa (Sulfonamide Antibiotics) Rash     ASSESSMENT:  ?????? ? ? This section established at most recent assessment??????????  1/16/17: Patient has attended 3 PT sessions from 11/30/16 to 1/16/17 for imbalance, unsteady gait, and episodes of dizziness. Patient came for appointments on 11/30/16, and 12/7/16, and now returns on 1/16/17. Patient is doing well with balance and mobility, reporting no falls. Her scores are improving with balance testing. Patient would benefit from continuing PT to address these problems to improve patient's independence and safety with mobility and daily activities. Thank you. PROBLEM LIST (Impairments causing functional limitations):  1. Decreased Strength affecting function  2. Decreased Activity tolerance    3. Imbalance  4. Unsteady gait  5. dizziness  GOALS: (Goals have been discussed and agreed upon with patient.)  SHORT-TERM FUNCTIONAL GOALS: Time Frame: 1-2 weeks  1. Patient will demonstrate independence and compliance with home exercise program to improve balance and strength for daily activities. met  2. DISCHARGE GOALS: Time Frame: 8-12 weeks  1. Patient will increase her score on the Lai Balance Scale to greater than or equal to 50/56 indicating improved safety and decreased fall risk for daily activities. MET  2. Patient will increase her score on the dynamic gait index to greater than or equal to 21/24 indicating improved balance and safety for community ambulation. Progressing and ongoing  3. Patient will report decreased dizziness to less than or equal to 1/10 with daily activities to improve safety and quality of life. Progressing and ongoing  4. Patient will increase bilateral lower extremity strength to greater than or equal to 4+/5 to improve strength for functional mobility activities. Progressing and ongoing  5. Patient will be able to step up/down a 6 inch curb safely and independently to improve mobility for daily activities. Progressing and ongoing  REHABILITATION POTENTIAL FOR STATED GOALS: Mirela Rollins OF CARE:  INTERVENTIONS PLANNED: (Benefits and precautions of physical therapy have been discussed with the patient.)  1. balance exercise  2. home exercise program (HEP)  3. neuromuscular re-education/strengthening  4. therapeutic activities  5. therapeutic exercise/strengthening  6. transfer training  7. Habituation and vestibular training  TREATMENT PLAN EFFECTIVE DATES: 11/30/2016 TO 2/22/2017  FREQUENCY/DURATION: Continue to follow patient 1-2 times a week for 8-12 weeks to address above goals. SUBJECTIVE:    History of Present Injury/Illness (Reason for Referral): Problem with my balance and get really dizzy sometimes. To have gallbladder surgery in the next few weeks, to be scheduled. Need handrail for steps and fell down steps one time. Last fall was many years ago, but has had a few near falls more frequently, usually from tripping. Swimmy headed feeling. Flare ups of dizziness come and go. 2 times a few weeks ago, lasting less than an hour; sat down to rest and it went away. Vision: extremely near sighted and wear contacts; hearing: WNL. Need to go to ENT but haven't been there yet. Nothing triggers the dizziness. No headaches. Does drive. Lives alone. Does gardening in warmer months.  Does grocery shopping. No dizziness with position changes in bed. I do want to mention to you that sometimes when I exercise, my O2 sats drop. I don't have O2 anymore, but I do have a sat monitor at home. Present Symptoms: \"My allergies are acting up, so my stamina is not great today. \"  Pain Intensity 1: 0  Dominant Side: right  Past Medical History:   Past Medical History   Diagnosis Date    Adverse effect of anesthesia      \"O2 level dropped during JEANMARIE\"    Aortic stenosis      s/p AVR 2015 with Dr. Chi Vega    Asthma      controlled per pt     Atrial fibrillation ([facility] [address]      after AVR 2015 for short duration - no evidence of recurrence per card note 16    Chronic sinusitis     COPD (chronic obstructive pulmonary disease) ([facility] [address]      Per pulm note 11/15/16: \"Severe COPD now very well controlled on ICS/LABA, LAMA and CLINT (no use in last month, no exacerbations since last seen). Hilmar Mellow Hilmar Mellow \"    Depression     Heart murmur     High cholesterol     Hypertension     Osteoporosis     PVD (peripheral vascular disease) ([facility] [address]      per card note 16    Thyroid disease     Tinnitus of both ears     Varicose vein of leg      . Past Surgical History   Procedure Laterality Date    Pr thyroidectomy  1970    Hx urological       kidney stone    Hx  section       x3    Hx aortic valve replacement  May 2015    Hx breast lumpectomy  1970     benign       Current Medications:   Current Outpatient Prescriptions   Medication Sig    benzonatate (TESSALON) 200 mg capsule Take 1 Cap by mouth three (3) times daily as needed for Cough.  pravastatin (PRAVACHOL) 20 mg tablet Take 1 Tab by mouth nightly.  cefdinir (OMNICEF) 300 mg capsule Take 1 Cap by mouth two (2) times a day.  oxyCODONE-acetaminophen (PERCOCET) 5-325 mg per tablet Take 1-2 Tabs by mouth every four (4) hours as needed for Pain. Max Daily Amount: 12 Tabs.  ALBUTEROL IN Take  by inhalation as needed.     CHOLECALCIFEROL, VITAMIN D3, (VITAMIN D3 PO) Take  by mouth daily.  MULTIVITAMIN WITH MINERALS (HAIR,SKIN AND NAILS PO) Take  by mouth daily.  famotidine (PEPCID) 20 mg tablet Take 1 Tab by mouth daily. (Patient taking differently: Take 20 mg by mouth daily. Take / use AM day of surgery  per anesthesia protocols.)    fexofenadine-pseudoephedrine (ALLEGRA-D 24 HOUR) 180-240 mg per tablet Take 1 Tab by mouth daily. (Patient taking differently: Take 1 Tab by mouth daily. Indications: ALLERGIC RHINITIS)    fluticasone-vilanterol (BREO ELLIPTA) 100-25 mcg/dose inhaler Take 1 Puff by inhalation daily. (Patient taking differently: Take 1 Puff by inhalation daily. Take / use AM day of surgery  per anesthesia protocols.)    sertraline (ZOLOFT) 100 mg tablet Take 1 Tab by mouth daily. (Patient taking differently: Take 100 mg by mouth every evening.)    aspirin delayed-release 81 mg tablet Take 81 mg by mouth daily. Take / use AM day of surgery  per anesthesia protocols.  SPIRIVA RESPIMAT 2.5 mcg/actuation mist Take 2 Puffs by inhalation daily. Take / use AM day of surgery  per anesthesia protocols.  carvedilol (COREG) 3.125 mg tablet Take 3.125 mg by mouth two (2) times daily (with meals). Take / use AM day of surgery  per anesthesia protocols.  furosemide (LASIX) 40 mg tablet Take 40 mg by mouth daily as needed. No current facility-administered medications for this encounter. Date Last Reviewed: 2/16/17  Social History/Home Situation: lives alone. A few steps to get in the house, 2 story but does not need to go upstairs often. .     Work/Activity History: retired.   OBJECTIVE:    GROSS PRESENTATION/POSTURE: forward head posture   FUNCTIONAL MOBILITY:  · Bed Mobility: independent    · Transfers: independent    LE STRENGTH:  4/5 hip flexion, 4/5 hip abduction, 4/5 hip adduction, 4/5 hip extension, 4/5 knee extension, 4/5 knee flexion, 4/5 ankle dorsiflexion, 4/5 ankle plantarflexion  SENSATION: intact  POSTURAL CONTROL & BALANCE:  · Lai Balance Scale:  50/56.  (A score less than 45/56 indicates high risk of falls)      Comments: score at initial evaluation was 46/56  · Dynamic Gait Index:  18/24.   (A score less than or equal to 19/24 is abnormal and predictive of falls)      Comments: score at initial evaluation was 17/24  · Smart Equitest Sensory Organization Test:  Objective findings indicate Normal use of somatosensory, Normal use of visual, & Decreased use of vestibular inputs to balance. Center of gravity is shifted anteriorly. See scanned report. · Smart Equitest Limits of Stability Test: NT  · Other Smart Equitest Testing:  NT  OCULOMOTOR EXAM:  · Eye Range of Motion:  full range of motion  · Cervical Range of Motion:   diminished range of motion  · Spontaneous nystagmus:  NO  · Gaze holding nystagmus:  NO  · Skew Deviation:  none  · Smooth Pursuit:      [x]Smooth Eye Movements    []Delayed Eye Movements     []Within Normal Limits     []Other(comment): · Voluntary Saccades:      [x]Smooth Eye Movements    []Delayed Eye Movements     []Within Normal Limits     []Other(comment): · Optokinetic cloth: NT  VESTIBULAR OCULAR REFLEX TESTING:  · Dynamic Visual Acuity Test: NT  · Head Thrust Test: Negative to the right. Negative to the left. · VOR Cancellation: WNL  POSITION TESTS:  · Hallpike-[place] testing: NT due to patient subjectively reports no dizziness with rolling over or sitting up/lying down. · Motion Sensitivity Quotient: NT  QUALITY OF GAIT: Patient ambulates with no AD, with decreased pace and decreased step length, wide OWEN. OBJECTIVE MEASURES:   Tool Used: Dynamic Gait Index  Score:  Initial: 17/24 Most Recent: 18/24 (Date: 1/16/17 )   Interpretation of Score: Each section is scored on a 0-3 scale, 0 representing the patients inability to perform the task and 3 representing independence. The scores of each section are added together for a total score of 24.   Any score below 19 indicates increased risk for falls.  Score 24 23-19 18-15 14-10 9-5 4-1 0   Modifier CH CI CJ CK CL CM CN     ? Mobility - Walking and Moving Around:     - CURRENT STATUS: CJ - 20%-39% impaired, limited or restricted    - GOAL STATUS:  CI - 1%-19% impaired, limited or restricted    - D/C STATUS:  ---------------To be determined---------------    TREATMENT:    (In addition to Assessment/Re-Assessment sessions the following treatments were rendered)        Neuromuscular Re-education (35 Minutes):  Exercise/activities per grid below to improve balance, coordination, posture and proprioception. Required minimal verbal cues to promote static and dynamic balance in standing.   Balance/Vestibular Treatment:   Activity   Date  12/7/16   Date  1/16/17 Date  1/26/17 Date  2/2/17 Date  2/9/17 Date  2/16/17   Activity/Exercise   Sets/reps/equipment Sets/reps/  equipment Sets/reps/  equipment Sets/reps/  equipment Sets/reps/  equipment Sets/reps/  equipment   Walking with head turns     4 laps in hallway 4 laps in hallway 4 laps in hallway 4 laps in hallway 4 laps in hallway 4 laps in hallway   Walking with head up & down     4 laps in hallway 4 laps in hallway 4 laps in hallway 4 laps in hallway 4 laps in hallway 4 laps in hallway   Step ups     6 inch step x 10 reps each leg with rail  6 inch step x 10 reps each leg with rail 6 inch step x 10 reps each leg with rail 6 inch step x 10 reps each leg with rail 6 inch step x 10 reps each leg with rail   Step taps     6 inch step x 30 reps 6 inch step x 30 reps 6 inch step x 30 reps 6 inch step x 30 reps 6 inch step x 30 reps 6 inch step x 30 reps   Marching   4 laps in hallway 4 laps in hallway 4 laps in hallway 4 laps in hallway 4 laps in hallway 4 laps in hallway   Sidestepping   4 laps in hallway 4 laps in hallway 4 laps in hallway 4 laps in hallway 4 laps in hallway 4 laps in hallway   Step overs   Over 1/2 foam roll x 10 reps each leg Over 1/2 foam roll x 15 reps each leg Over 1/2 foam roll x 15 reps each leg Over 1/2 foam roll x 15 reps each leg Over 1/2 foam roll x 15 reps each leg Over 1/2 foam roll x 15 reps each leg   [place]           Walking  backwards     4 laps in hallway  4 laps in hallway 4 laps in hallway 4 laps in hallway 4 laps in hallway   Tandem walking           Weaving in/out of cones     4 laps in hallway  4 laps in hallway 4 laps in hallway 4 laps in hallway 4 laps in hallway   Picking up cones             Sports cord             [employer]           Kick ball           Figure 8s            Circles right/left           Walking with 360 degree turns           Spirals           Weight shifting:    Left & Right     Blue foams eyes open Blue foams eyes open Blue foams eyes open Yellow foam eyes open  Yellow foam eyes open  Yellow foam eyes open    Weight shifting:   Forward & Backward      Blue foams eyes open Blue foams eyes open Blue foams eyes open Yellow foam eyes open  Yellow foam eyes open  Yellow foam eyes open    Static Standing Balance     Blue foams eyes open and closed Blue foams eyes open and closed Blue foams eyes open and closed Yellow foam eyes open  and closed Yellow foam eyes open  and closed Yellow foam eyes open  and closed   Standing with feet apart     Blue foams eyes open with head turns and up/down Blue foams eyes open with head turns and up/down Blue foams eyes open with head turns and up/down Yellow foam eyes open with head turns and head up/down Yellow foam eyes open with head turns and head up/down Yellow foam eyes open with head turns and head up/down   Standing with feet together             Standing with feet semitandem   Blue foams eyes open and closed Blue foams eyes open and closed Blue foams eyes open and closed Blue foams eyes open and closed Blue foams eyes open and closed Blue foams eyes open and closed   Standing with feet tandem           Single leg stance           X1/X2 Viewing exercises             Hallpike-Jose David testing for BPPV (Benign Paroxysmal Positional Vertigo)             Pedro-Lydia exercises           Canalith Repositioning treatment/Epley Maneuver  for BPPV (Benign Paroxysmal Positional Vertigo)           Smart Equitest Training: See scanned report. Therapeutic Exercise: ( ):  Exercises per grid below to improve mobility, strength and balance. Required minimal verbal cues to promote proper body alignment, promote proper body posture and promote proper body mechanics. Progressed resistance, repetitions and complexity of movement as indicated. Date:  1/16/17 Date:  1/26/17 Date:  2/2/17 Date   2/9/17 Date   2/16/17   Activity/Exercise Parameters Parameters Parameters     nustep Level 3 x 6 minutes Level 3 x 6 minutes Level 3 x 10 minutes Level 3 x 8 minutes Level 3 x 8 minutes   Sit to stand From mat table x 10 reps From mat table x 10 reps From mat table 2 x 10 reps From mat table 2 x 10 reps From mat table 2 x 10 reps                                               HEP: handouts given to patient for balance exercises. _____________________________________________________  Treatment Assessment:  Patient tolerated exercises without complaints. Balance is improving. REASSESS NEXT VISIT. Progression/Medical Necessity:   · Patient is expected to demonstrate progress in strength, balance and dizziness to improve safety during daily activities. Compliance with Program/Exercises: compliant  Reason for Continuation of Services/Other Comments:  · Patient continues to demonstrate capacity to improve strength, balance, dizziness which will increase independence and increase safety. Recommendations/Intent for next treatment session: \"Treatment next visit will focus on advancements to more challenging activities\".     Total Treatment Duration:  PT Patient Time In/Time Out  Time In: 1310  Time Out: Maximilian Pradhan [address] Mike Reina

## 2017-02-20 ENCOUNTER — HOSPITAL ENCOUNTER (OUTPATIENT)
Dept: PHYSICAL THERAPY | Age: 77
Discharge: HOME | End: 2017-02-20
Attending: FAMILY MEDICINE
Payer: MEDICARE

## 2017-02-20 PROCEDURE — G8980 MOBILITY D/C STATUS: HCPCS

## 2017-02-20 PROCEDURE — 97110 THERAPEUTIC EXERCISES: CPT

## 2017-02-20 PROCEDURE — 97112 NEUROMUSCULAR REEDUCATION: CPT

## 2017-02-20 PROCEDURE — G8979 MOBILITY GOAL STATUS: HCPCS

## 2017-02-20 NOTE — PROGRESS NOTES
René Edge   ([facility]:[birth year]85) Therapy Center at  [facility], [address] [address],[address],   [facility].  [phone]   [phone]         Outpatient PHYSICAL THERAPY: Daily Note and Discharge 2/20/2017  Fall Risk Score: 2 (5+ = High Risk)    ICD-10: Treatment Diagnosis: other abnormalities of gait and mobility (R26.89); Dizziness and giddiness R42  REFERRING PHYSICIAN: Teresa Live MD Orders: eval and treat  Return Physician Appointment:   MEDICAL/REFERRING DIAGNOSIS: R42 Dizziness  DATE OF ONSET: dizziness for several years   PRIOR LEVEL OF FUNCTION: independent  PRECAUTIONS/ALLERGIES:   Allergies   Allergen Reactions    Sulfa (Sulfonamide Antibiotics) Rash     ASSESSMENT:  ?????? ? ? This section established at most recent assessment??????????  2/20/17: Patient has attended 8 PT sessions from 11/30/16 to 2/20/17 for imbalance, unsteady gait, and episodes of dizziness. Patient has done well with balance and mobility, reporting no falls. Her balance test scores have improved to normal limits. LE strength has also improved. We will discharge patient at this time as she has met goals. Patient is to continue with HEP to maintain her progress. Thank you. PROBLEM LIST (Impairments causing functional limitations):  1. Decreased Strength affecting function  2. Decreased Activity tolerance     3. Imbalance  4. Unsteady gait  5. dizziness  GOALS: (Goals have been discussed and agreed upon with patient.)  SHORT-TERM FUNCTIONAL GOALS: Time Frame: 1-2 weeks  1. Patient will demonstrate independence and compliance with home exercise program to improve balance and strength for daily activities. met  2. DISCHARGE GOALS: Time Frame: 8-12 weeks  1. Patient will increase her score on the Lai Balance Scale to greater than or equal to 50/56 indicating improved safety and decreased fall risk for daily activities. MET  2.  Patient will increase her score on the dynamic gait index to greater than or equal to 21/24 indicating improved balance and safety for community ambulation. MET  3. Patient will report decreased dizziness to less than or equal to 1/10 with daily activities to improve safety and quality of life. MET  4. Patient will increase bilateral lower extremity strength to greater than or equal to 4+/5 to improve strength for functional mobility activities. MET  5. Patient will be able to step up/down a 6 inch curb safely and independently to improve mobility for daily activities. MET  REHABILITATION POTENTIAL FOR STATED GOALS: Candelaria Ramirez OF CARE:  INTERVENTIONS PLANNED: (Benefits and precautions of physical therapy have been discussed with the patient.)  1. balance exercise  2. home exercise program (HEP)  3. neuromuscular re-education/strengthening  4. therapeutic activities  5. therapeutic exercise/strengthening  6. transfer training  7. Habituation and vestibular training  TREATMENT PLAN EFFECTIVE DATES: 11/30/2016 TO 2/20/2017  FREQUENCY/DURATION: Discharge today        SUBJECTIVE:    History of Present Injury/Illness (Reason for Referral): Problem with my balance and get really dizzy sometimes. To have gallbladder surgery in the next few weeks, to be scheduled. Need handrail for steps and fell down steps one time. Last fall was many years ago, but has had a few near falls more frequently, usually from tripping. Swimmy headed feeling. Flare ups of dizziness come and go. 2 times a few weeks ago, lasting less than an hour; sat down to rest and it went away. Vision: extremely near sighted and wear contacts; hearing: WNL. Need to go to ENT but haven't been there yet. Nothing triggers the dizziness. No headaches. Does drive. Lives alone. Does gardening in warmer months. Does grocery shopping. No dizziness with position changes in bed. I do want to mention to you that sometimes when I exercise, my O2 sats drop. I don't have O2 anymore, but I do have a sat monitor at home. Present Symptoms: \"I'm doing well. \"  Pain Intensity 1: 0  Dominant Side: right  Past Medical History:   Past Medical History   Diagnosis Date    Adverse effect of anesthesia      \"O2 level dropped during JEANMARIE\"    Aortic stenosis      s/p AVR 2015 with Dr. Smith Doctor    Asthma      controlled per pt     Atrial fibrillation ([facility] [address]      after AVR 2015 for short duration - no evidence of recurrence per card note 16    Chronic sinusitis     COPD (chronic obstructive pulmonary disease) ([facility] [address]      Per pulm note 11/15/16: \"Severe COPD now very well controlled on ICS/LABA, LAMA and CLINT (no use in last month, no exacerbations since last seen). Twan Gonzalez \"    Depression     Heart murmur     High cholesterol     Hypertension     Osteoporosis     PVD (peripheral vascular disease) ([facility] [address]      per card note 16    Thyroid disease     Tinnitus of both ears     Varicose vein of leg      . Past Surgical History   Procedure Laterality Date    Pr thyroidectomy  1970    Hx urological       kidney stone    Hx  section       x3    Hx aortic valve replacement  May 2015    Hx breast lumpectomy  1970     benign       Current Medications:   Current Outpatient Prescriptions   Medication Sig    benzonatate (TESSALON) 200 mg capsule Take 1 Cap by mouth three (3) times daily as needed for Cough.  pravastatin (PRAVACHOL) 20 mg tablet Take 1 Tab by mouth nightly.  cefdinir (OMNICEF) 300 mg capsule Take 1 Cap by mouth two (2) times a day.  oxyCODONE-acetaminophen (PERCOCET) 5-325 mg per tablet Take 1-2 Tabs by mouth every four (4) hours as needed for Pain. Max Daily Amount: 12 Tabs.  ALBUTEROL IN Take  by inhalation as needed.  CHOLECALCIFEROL, VITAMIN D3, (VITAMIN D3 PO) Take  by mouth daily.  MULTIVITAMIN WITH MINERALS (HAIR,SKIN AND NAILS PO) Take  by mouth daily.  famotidine (PEPCID) 20 mg tablet Take 1 Tab by mouth daily. (Patient taking differently: Take 20 mg by mouth daily.  Take / use AM day of surgery  per anesthesia protocols.)    fexofenadine-pseudoephedrine (ALLEGRA-D 24 HOUR) 180-240 mg per tablet Take 1 Tab by mouth daily. (Patient taking differently: Take 1 Tab by mouth daily. Indications: ALLERGIC RHINITIS)    fluticasone-vilanterol (BREO ELLIPTA) 100-25 mcg/dose inhaler Take 1 Puff by inhalation daily. (Patient taking differently: Take 1 Puff by inhalation daily. Take / use AM day of surgery  per anesthesia protocols.)    sertraline (ZOLOFT) 100 mg tablet Take 1 Tab by mouth daily. (Patient taking differently: Take 100 mg by mouth every evening.)    aspirin delayed-release 81 mg tablet Take 81 mg by mouth daily. Take / use AM day of surgery  per anesthesia protocols.  SPIRIVA RESPIMAT 2.5 mcg/actuation mist Take 2 Puffs by inhalation daily. Take / use AM day of surgery  per anesthesia protocols.  carvedilol (COREG) 3.125 mg tablet Take 3.125 mg by mouth two (2) times daily (with meals). Take / use AM day of surgery  per anesthesia protocols.  furosemide (LASIX) 40 mg tablet Take 40 mg by mouth daily as needed. No current facility-administered medications for this encounter. Date Last Reviewed: 2/20/17  Social History/Home Situation: lives alone. A few steps to get in the house, 2 story but does not need to go upstairs often. .     Work/Activity History: retired. OBJECTIVE:    GROSS PRESENTATION/POSTURE: forward head posture   FUNCTIONAL MOBILITY:  · Bed Mobility: independent    · Transfers: independent    LE STRENGTH: 11/30/16:  4/5 hip flexion, 4/5 hip abduction, 4/5 hip adduction, 4/5 hip extension, 4/5 knee extension, 4/5 knee flexion, 4/5 ankle dorsiflexion, 4/5 ankle plantarflexion  LE STRENGTH:  2/20/17: 4+/5 hip flexion, 4+/5 hip abduction, 4+/5 hip adduction, 4+/5 hip extension, 4+/5 knee extension, 4+/5 knee flexion, 4+/5 ankle dorsiflexion, 4+/5 ankle plantarflexion.   SENSATION: intact  POSTURAL CONTROL & BALANCE:  · Lai Balance Scale:  53/56.  (A score less than 45/56 indicates high risk of falls)      Comments: score at initial evaluation was 46/56  · Dynamic Gait Index:  23/24.   (A score less than or equal to 19/24 is abnormal and predictive of falls)      Comments: score at initial evaluation was 17/24  · Smart Equitest Sensory Organization Test:  Objective findings indicate Normal use of somatosensory, Normal use of visual, & Decreased use of vestibular inputs to balance. Center of gravity is shifted anteriorly. See scanned report. · Smart Equitest Limits of Stability Test: NT  · Other Smart Equitest Testing:  NT  OCULOMOTOR EXAM:  · Eye Range of Motion:  full range of motion  · Cervical Range of Motion:   diminished range of motion  · Spontaneous nystagmus:  NO  · Gaze holding nystagmus:  NO  · Skew Deviation:  none  · Smooth Pursuit:      [x]Smooth Eye Movements    []Delayed Eye Movements     []Within Normal Limits     []Other(comment): · Voluntary Saccades:      [x]Smooth Eye Movements    []Delayed Eye Movements     []Within Normal Limits     []Other(comment): · Optokinetic cloth: NT  VESTIBULAR OCULAR REFLEX TESTING:  · Dynamic Visual Acuity Test: NT  · Head Thrust Test: Negative to the right. Negative to the left. · VOR Cancellation: WNL  POSITION TESTS:  · Hallpike-Jose David testing: NT due to patient subjectively reports no dizziness with rolling over or sitting up/lying down. · Motion Sensitivity Quotient: NT  QUALITY OF GAIT: Patient ambulates with no AD, with decreased pace and decreased step length, wide OWEN. OBJECTIVE MEASURES:   Tool Used: Dynamic Gait Index  Score:  Initial: 17/24 Most Recent: 23/24 (Date: 2/20/17 )   Interpretation of Score: Each section is scored on a 0-3 scale, 0 representing the patients inability to perform the task and 3 representing independence. The scores of each section are added together for a total score of 24. Any score below 19 indicates increased risk for falls.   Score 24 23-19 18-15 14-10 9-5 4-1 0 Modifier CH CI CJ CK CL CM CN     ? Mobility - Walking and Moving Around:     - CURRENT STATUS: CI - 1%-19% impaired, limited or restricted    - GOAL STATUS:  CI - 1%-19% impaired, limited or restricted    - D/C STATUS:  CI - 1%-19% impaired, limited or restricted    TREATMENT:    (In addition to Assessment/Re-Assessment sessions the following treatments were rendered)        Neuromuscular Re-education (30 Minutes):  Exercise/activities per grid below to improve balance, coordination, posture and proprioception. Required minimal verbal cues to promote static and dynamic balance in standing.   Balance/Vestibular Treatment:   Activity   Date  12/7/16   Date  1/16/17 Date  1/26/17 Date  2/2/17 Date  2/9/17 Date  2/16/17 Date   2/20/17   Activity/Exercise   Sets/reps/equipment Sets/reps/  equipment Sets/reps/  equipment Sets/reps/  equipment Sets/reps/  equipment Sets/reps/  equipment    Walking with head turns     4 laps in hallway 4 laps in hallway 4 laps in hallway 4 laps in hallway 4 laps in hallway 4 laps in hallway 4 laps in [facility]   Walking with head up & down     4 laps in hallway 4 laps in hallway 4 laps in hallway 4 laps in hallway 4 laps in hallway 4 laps in hallway 4 laps in hallway   Step ups     6 inch step x 10 reps each leg with rail  6 inch step x 10 reps each leg with rail 6 inch step x 10 reps each leg with rail 6 inch step x 10 reps each leg with rail 6 inch step x 10 reps each leg with rail 6 inch step x 10 reps each leg with rail   Step taps     6 inch step x 30 reps 6 inch step x 30 reps 6 inch step x 30 reps 6 inch step x 30 reps 6 inch step x 30 reps 6 inch step x 30 reps 6 inch step x 30 reps   Marching   4 laps in hallway 4 laps in hallway 4 laps in hallway 4 laps in hallway 4 [facility] in hallway [facility]   Sidestepping   4 laps in hallway 4 laps in hallway 4 laps in hallway 4 laps in hallway 4 laps in hallway 4 laps in hallway 4 laps in hallway   Step overs   Over 1/2 foam roll x 10 reps each leg Over 1/2 foam roll x 15 reps each leg Over 1/2 foam roll x 15 reps each leg Over 1/2 foam roll x 15 reps each leg Over 1/2 foam roll x 15 reps each leg Over 1/2 foam roll x 15 reps each leg Over 1/2 foam roll x 15 reps each leg   [place]can            Walking  backwards     4 laps in hallway  4 laps in hallway 4 laps in hallway 4 laps in hallway 4 laps in hallway 4 laps in hallway   Tandem walking            Weaving in/out of cones     4 laps in hallway  4 laps in hallway 4 laps in hallway 4 laps in hallway 4 laps in hallway 4 laps in hallway   Picking up cones              Sports cord              Pepe Kye toss            Kick ball            Figure 8s             Circles right/left            Walking with 360 degree turns            Spirals            Weight shifting:    Left & Right     Blue foams eyes open Blue foams eyes open Blue foams eyes open Yellow foam eyes open  Yellow foam eyes open  Yellow foam eyes open  Yellow foam eyes open    Weight shifting:   Forward & Backward      Blue foams eyes open Blue foams eyes open Blue foams eyes open Yellow foam eyes open  Yellow foam eyes open  Yellow foam eyes open  Yellow foam eyes open    Static Standing Balance     Blue foams eyes open and closed Blue foams eyes open and closed Blue foams eyes open and closed Yellow foam eyes open  and closed Yellow foam eyes open  and closed Yellow foam eyes open  and closed Yellow foam eyes open  and closed   Standing with feet apart     Blue foams eyes open with head turns and up/down Blue foams eyes open with head turns and up/down Blue foams eyes open with head turns and up/down Yellow foam eyes open with head turns and head up/down Yellow foam eyes open with head turns and head up/down Yellow foam eyes open with head turns and head up/down Yellow foam eyes open with head turns and head up/down   Standing with feet together              Standing with feet semitandem   Blue foams eyes open and closed Blue foams eyes open and closed Blue foams eyes open and closed Blue foams eyes open and closed Blue foams eyes open and closed Blue foams eyes open and closed Blue foams eyes open and closed   Standing with feet tandem            Single leg stance            X1/X2 Viewing exercises              Hallpike-Jose David testing for BPPV (Benign Paroxysmal Positional Vertigo)              Vasquez-Daroff exercises            Canalith Repositioning treatment/Epley Maneuver  for BPPV (Benign Paroxysmal Positional Vertigo)            Smart Equitest Training: See scanned report. Therapeutic Exercise: (15 Minutes):  Exercises per grid below to improve mobility, strength and balance. Required minimal verbal cues to promote proper body alignment, promote proper body posture and promote proper body mechanics. Progressed resistance, repetitions and complexity of movement as indicated. Date:  1/16/17 Date:  1/26/17 Date:  2/2/17 Date   2/9/17 Date   2/16/17 Date   2/20/17   Activity/Exercise Parameters Parameters Parameters      nustep Level 3 x 6 minutes Level 3 x 6 minutes Level 3 x 10 minutes Level 3 x 8 minutes Level 3 x 8 minutes Level 3 x 8 minutes   Sit to stand From mat table x 10 reps From mat table x 10 reps From mat table 2 x 10 reps From mat table 2 x 10 reps From mat table 2 x 10 reps From mat table 2 x 10 reps                                                    HEP: handouts given to patient for balance exercises. _____________________________________________________  Treatment Assessment:  Patient tolerated exercises without complaints. Discharge today with HEP. Progression/Medical Necessity:   · Discharge today  Compliance with Program/Exercises: compliant  Reason for Continuation of Services/Other Comments:  Discharge today.    Total Treatment Duration:  PT Patient Time In/Time Out  Time In: 1320  Time Out: 400 [facility] [address]

## 2017-08-29 PROBLEM — I10 ESSENTIAL HYPERTENSION: Status: ACTIVE | Noted: 2017-08-29

## 2017-09-21 ENCOUNTER — HOSPITAL ENCOUNTER (OUTPATIENT)
Dept: GENERAL RADIOLOGY | Age: 77
Discharge: HOME | End: 2017-09-21
Payer: MEDICARE

## 2017-09-21 DIAGNOSIS — J44.9 CHRONIC OBSTRUCTIVE PULMONARY DISEASE, UNSPECIFIED COPD TYPE (HCC): ICD-10-CM

## 2017-09-21 PROCEDURE — 71020 XR CHEST PA LAT: CPT

## 2018-04-04 ENCOUNTER — HOSPITAL ENCOUNTER (OUTPATIENT)
Dept: GENERAL RADIOLOGY | Age: 78
Discharge: HOME | End: 2018-04-04
Payer: COMMERCIAL

## 2018-04-04 DIAGNOSIS — R05.9 COUGH: ICD-10-CM

## 2018-04-04 PROBLEM — G47.34 NOCTURNAL HYPOXEMIA: Status: ACTIVE | Noted: 2018-04-04

## 2018-04-04 PROCEDURE — 71046 X-RAY EXAM CHEST 2 VIEWS: CPT

## 2018-07-03 NOTE — PROGRESS NOTES
Monica Evans   (ZGB:[birth year]) [facility] at  [place] [address],   [place] [address].  [phone]   [phone]         Outpatient PHYSICAL THERAPY: Daily Note and Progress Report 1/16/2017  Fall Risk Score: 2 (5+ = High Risk)    ICD-10: Treatment Diagnosis: other abnormalities of gait and mobility (R26.89); Dizziness and giddiness R42  REFERRING PHYSICIAN: Teresa Neely MD Orders: eval and treat  Return Physician Appointment:   MEDICAL/REFERRING DIAGNOSIS: R42 Dizziness  DATE OF ONSET: dizziness for several years   PRIOR LEVEL OF FUNCTION: independent  PRECAUTIONS/ALLERGIES:   Allergies   Allergen Reactions    Sulfa (Sulfonamide Antibiotics) Rash     ASSESSMENT:  ?????? ? ? This section established at most recent assessment??????????  1/16/17: Patient has attended 3 PT sessions from 11/30/16 to 1/16/17 for imbalance, unsteady gait, and episodes of dizziness. Patient came for appointments on 11/30/16, and 12/7/16, and now returns on 1/16/17. Patient is doing well with balance and mobility, reporting no falls. Her scores are improving with balance testing. Patient would benefit from continuing PT to address these problems to improve patient's independence and safety with mobility and daily activities. Thank you. PROBLEM LIST (Impairments causing functional limitations):  1. Decreased Strength affecting function  2. Decreased Activity tolerance    3. Imbalance  4. Unsteady gait  5. dizziness  GOALS: (Goals have been discussed and agreed upon with patient.)  SHORT-TERM FUNCTIONAL GOALS: Time Frame: 1-2 weeks  1. Patient will demonstrate independence and compliance with home exercise program to improve balance and strength for daily activities. met  2. DISCHARGE GOALS: Time Frame: 8-12 weeks  1.  Patient will increase her score on the Lai Balance Scale to greater than or equal to 50/56 indicating improved safety and decreased fall risk for daily activities. MET  2. Patient will increase her score on the dynamic gait index to greater than or equal to 21/24 indicating improved balance and safety for community ambulation. Progressing and ongoing  3. Patient will report decreased dizziness to less than or equal to 1/10 with daily activities to improve safety and quality of life. Progressing and ongoing  4. Patient will increase bilateral lower extremity strength to greater than or equal to 4+/5 to improve strength for functional mobility activities. Progressing and ongoing  5. Patient will be able to step up/down a 6 inch curb safely and independently to improve mobility for daily activities. Progressing and ongoing  REHABILITATION POTENTIAL FOR STATED GOALS: [facility]:  INTERVENTIONS PLANNED: (Benefits and precautions of physical therapy have been discussed with the patient.)  1. balance exercise  2. home exercise program (HEP)  3. neuromuscular re-education/strengthening  4. therapeutic activities  5. therapeutic exercise/strengthening  6. transfer training  7. Habituation and vestibular training  TREATMENT PLAN EFFECTIVE DATES: 11/30/2016 TO 2/22/2017  FREQUENCY/DURATION: Continue to follow patient 1-2 times a week for 8-12 weeks to address above goals. SUBJECTIVE:    History of Present Injury/Illness (Reason for Referral): Problem with my balance and get really dizzy sometimes. To have gallbladder surgery in the next few weeks, to be scheduled. Need handrail for steps and fell down steps one time. Last fall was many years ago, but has had a few near falls more frequently, usually from tripping. Swimmy headed feeling. Flare ups of dizziness come and go. 2 times a few weeks ago, lasting less than an hour; sat down to rest and it went away. Vision: extremely near sighted and wear contacts; hearing: WNL. Need to go to ENT but haven't been there yet. Nothing triggers the dizziness. No headaches. Does drive. Lives alone.  Does gardening in warmer months. Does grocery shopping. No dizziness with position changes in bed. I do want to mention to you that sometimes when I exercise, my O2 sats drop. I don't have O2 anymore, but I do have a sat monitor at home. Present Symptoms: Had gallbladder out and doing better. No falls. Have had a little dizziness but think it's from the sinus congestion. Haven't been doing HEP as much lately. Pain Intensity 1: 0  Dominant Side: right  Past Medical History:   Past Medical History   Diagnosis Date    Adverse effect of anesthesia      \"O2 level dropped during JEANMARIE\"    Aortic stenosis      s/p AVR 2015 with Dr. Sherren Hilt    Asthma      controlled per pt     Atrial fibrillation ([address]      after AVR 2015 for short duration - no evidence of recurrence per card note 16    Chronic sinusitis     COPD (chronic obstructive pulmonary disease) ([address]      Per pulm note 11/15/16: \"Severe COPD now very well controlled on ICS/LABA, LAMA and CLINT (no use in last month, no exacerbations since last seen). Judy Po Judy Po \"    Depression     Heart murmur     High cholesterol     Hypertension     Osteoporosis     PVD (peripheral vascular disease) ([address]      per card note 16    Thyroid disease     Tinnitus of both ears     Varicose vein of leg      . Past Surgical History   Procedure Laterality Date    Pr thyroidectomy  1970    Hx urological       kidney stone    Hx  section       x3    Hx aortic valve replacement  May 2015    Hx breast lumpectomy  1970     benign       Current Medications:   Current Outpatient Prescriptions   Medication Sig    pravastatin (PRAVACHOL) 20 mg tablet Take 1 Tab by mouth nightly.  cefdinir (OMNICEF) 300 mg capsule Take 1 Cap by mouth two (2) times a day.  oxyCODONE-acetaminophen (PERCOCET) 5-325 mg per tablet Take 1-2 Tabs by mouth every four (4) hours as needed for Pain. Max Daily Amount: 12 Tabs.  ALBUTEROL IN Take  by inhalation as needed.     CHOLECALCIFEROL, VITAMIN D3, (VITAMIN D3 PO) Take  by mouth daily.  MULTIVITAMIN WITH MINERALS (HAIR,SKIN AND NAILS PO) Take  by mouth daily.  famotidine (PEPCID) 20 mg tablet Take 1 Tab by mouth daily. (Patient taking differently: Take 20 mg by mouth daily. Take / use AM day of surgery  per anesthesia protocols.)    fexofenadine-pseudoephedrine (ALLEGRA-D 24 HOUR) 180-240 mg per tablet Take 1 Tab by mouth daily. (Patient taking differently: Take 1 Tab by mouth daily. Indications: ALLERGIC RHINITIS)    fluticasone-vilanterol (BREO ELLIPTA) 100-25 mcg/dose inhaler Take 1 Puff by inhalation daily. (Patient taking differently: Take 1 Puff by inhalation daily. Take / use AM day of surgery  per anesthesia protocols.)    sertraline (ZOLOFT) 100 mg tablet Take 1 Tab by mouth daily. (Patient taking differently: Take 100 mg by mouth every evening.)    aspirin delayed-release 81 mg tablet Take 81 mg by mouth daily. Take / use AM day of surgery  per anesthesia protocols.  SPIRIVA RESPIMAT 2.5 mcg/actuation mist Take 2 Puffs by inhalation daily. Take / use AM day of surgery  per anesthesia protocols.  carvedilol (COREG) 3.125 mg tablet Take 3.125 mg by mouth two (2) times daily (with meals). Take / use AM day of surgery  per anesthesia protocols.  furosemide (LASIX) 40 mg tablet Take 40 mg by mouth daily as needed. No current facility-administered medications for this encounter. Date Last Reviewed: 1/16/17  Social History/Home Situation: lives alone. A few steps to get in the house, 2 story but does not need to go upstairs often. .     Work/Activity History: retired.   OBJECTIVE:    GROSS PRESENTATION/POSTURE: forward head posture   FUNCTIONAL MOBILITY:  · Bed Mobility: independent    · Transfers: independent    LE STRENGTH:  4/5 hip flexion, 4/5 hip abduction, 4/5 hip adduction, 4/5 hip extension, 4/5 knee extension, 4/5 knee flexion, 4/5 ankle dorsiflexion, 4/5 ankle plantarflexion  SENSATION: intact  POSTURAL CONTROL & English BALANCE:  · Lai Balance Scale:  50/56.  (A score less than 45/56 indicates high risk of falls)      Comments: score at initial evaluation was 46/56  · Dynamic Gait Index:  18/24.   (A score less than or equal to 19/24 is abnormal and predictive of falls)      Comments: score at initial evaluation was 17/24  · Smart Equitest Sensory Organization Test:  Objective findings indicate Normal use of somatosensory, Normal use of visual, & Decreased use of vestibular inputs to balance. Center of gravity is shifted anteriorly. See scanned report. · Smart Equitest Limits of Stability Test: NT  · Other Smart Equitest Testing:  NT  OCULOMOTOR EXAM:  · Eye Range of Motion:  full range of motion  · Cervical Range of Motion:   diminished range of motion  · Spontaneous nystagmus:  NO  · Gaze holding nystagmus:  NO  · Skew Deviation:  none  · Smooth Pursuit:      [x]Smooth Eye Movements    []Delayed Eye Movements     []Within Normal Limits     []Other(comment): · Voluntary Saccades:      [x]Smooth Eye Movements    []Delayed Eye Movements     []Within Normal Limits     []Other(comment): · Optokinetic cloth: NT  VESTIBULAR OCULAR REFLEX TESTING:  · Dynamic Visual Acuity Test: NT  · Head Thrust Test: Negative to the right. Negative to the left. · VOR Cancellation: WNL  POSITION TESTS:  · Hallpike-Redmond testing: NT due to patient subjectively reports no dizziness with rolling over or sitting up/lying down. · Motion Sensitivity Quotient: NT  QUALITY OF GAIT: Patient ambulates with no AD, with decreased pace and decreased step length, wide OWEN. OBJECTIVE MEASURES:   Tool Used: Dynamic Gait Index  Score:  Initial: 17/24 Most Recent: 18/24 (Date: 1/16/17 )   Interpretation of Score: Each section is scored on a 0-3 scale, 0 representing the patients inability to perform the task and 3 representing independence. The scores of each section are added together for a total score of 24.   Any score below 19 indicates increased risk for falls.  Score 24 23-19 18-15 14-10 9-5 4-1 0   Modifier CH CI CJ CK CL CM CN     ? Mobility - Walking and Moving Around:     - CURRENT STATUS: CJ - 20%-39% impaired, limited or restricted    - GOAL STATUS:  CI - 1%-19% impaired, limited or restricted    - D/C STATUS:  ---------------To be determined---------------    TREATMENT:    (In addition to Assessment/Re-Assessment sessions the following treatments were rendered)        Neuromuscular Re-education (35 Minutes):  Exercise/activities per grid below to improve balance, coordination, posture and proprioception. Required minimal verbal cues to promote static and dynamic balance in standing. Balance/Vestibular Treatment:   Activity   Date  12/7/16   Date  1/16/17 Date Date Date Date   Activity/Exercise   Sets/reps/equipment Sets/reps/  equipment Sets/reps/  equipment Sets/reps/  equipment Sets/reps/  equipment Sets/reps/  equipment   Walking with head turns     4 laps in hallway 4 laps in hallway       Walking with head up & down     4 laps in hallway 4 laps in hallway       Step ups     6 inch step x 10 reps each leg with rail        Step taps     6 inch step x 30 reps 6 inch step x 30 reps       Marching   4 laps in hallway 4 laps in hallway       Sidestepping   4 laps in hallway 4 laps in hallway       Step overs   Over 1/2 foam roll x 10 reps each leg Over 1/2 foam roll x 15 reps each leg       [place]           Walking  backwards     4 laps in hallway        Tandem walking           Weaving in/out of cones     4 laps in hallway        Picking up cones             Sports cord             [employer]           Kick ball           Figure 8s            Circles right/left           Walking with 360 degree turns           Spirals           Weight shifting:    Left & Right     Blue foams eyes open Blue foams eyes open       Weight shifting:   Forward & Backward      Blue foams eyes open Blue foams eyes open       Static Standing Balance     Blue foams eyes open and closed Blue foams eyes open and closed       Standing with feet apart     Blue foams eyes open with head turns and up/down Blue foams eyes open with head turns and up/down       Standing with feet together             Standing with feet semitandem   Blue foams eyes open and closed Blue foams eyes open and closed       Standing with feet tandem           Single leg stance           X1/X2 Viewing exercises             Hallpike-Jose David testing for BPPV (Benign Paroxysmal Positional Vertigo)             Vasquez-Daroff exercises           Canalith Repositioning treatment/Epley Maneuver  for BPPV (Benign Paroxysmal Positional Vertigo)           Smart Equitest Training: See scanned report. Therapeutic Exercise: (10 Minutes):  Exercises per grid below to improve mobility, strength and balance. Required minimal verbal cues to promote proper body alignment, promote proper body posture and promote proper body mechanics. Progressed resistance, repetitions and complexity of movement as indicated. Date:  1/16/17 Date:   Date:     Activity/Exercise Parameters Parameters Parameters   nustep Level 3 x 6 minutes     Sit to stand From mat table x 10 reps                                       HEP: handouts given to patient for balance exercises. _____________________________________________________  Treatment Assessment:  Patient did well with exercises. No dizziness or pain with exercises. Continue to progress as tolerated. Progression/Medical Necessity:   · Patient is expected to demonstrate progress in strength, balance and dizziness to improve safety during daily activities. Compliance with Program/Exercises: compliant  Reason for Continuation of Services/Other Comments:  · Patient continues to demonstrate capacity to improve strength, balance, dizziness which will increase independence and increase safety.   Recommendations/Intent for next treatment session: \"Treatment next visit will focus on advancements to more challenging activities\".     Total Treatment Duration:  PT Patient Time In/Time Out  Time In: 1315  Time Out: 1465 [facility] [place] [employer]

## 2018-11-15 PROBLEM — I87.2 CHRONIC VENOUS STASIS DERMATITIS OF BOTH LOWER EXTREMITIES: Status: ACTIVE | Noted: 2018-11-15

## 2018-11-15 PROBLEM — Z95.2 STATUS POST AORTIC VALVE REPLACEMENT: Status: ACTIVE | Noted: 2018-11-15

## 2019-01-26 ENCOUNTER — HOSPITAL ENCOUNTER (EMERGENCY)
Age: 79
Discharge: HOME | End: 2019-01-26
Attending: EMERGENCY MEDICINE | Admitting: EMERGENCY MEDICINE
Payer: MEDICARE

## 2019-01-26 VITALS
WEIGHT: 180 LBS | HEIGHT: 64 IN | BODY MASS INDEX: 30.73 KG/M2 | OXYGEN SATURATION: 98 % | SYSTOLIC BLOOD PRESSURE: 155 MMHG | TEMPERATURE: 98.1 F | RESPIRATION RATE: 18 BRPM | DIASTOLIC BLOOD PRESSURE: 79 MMHG | HEART RATE: 89 BPM

## 2019-01-26 DIAGNOSIS — J44.9 CHRONIC OBSTRUCTIVE PULMONARY DISEASE, UNSPECIFIED COPD TYPE (HCC): Primary | ICD-10-CM

## 2019-01-26 LAB
ALBUMIN SERPL-MCNC: 3.9 G/DL (ref 3.2–4.6)
ALBUMIN/GLOB SERPL: 1 {RATIO} (ref 1.2–3.5)
ALP SERPL-CCNC: 85 U/L (ref 50–136)
ALT SERPL-CCNC: 22 U/L (ref 12–65)
ANION GAP SERPL CALC-SCNC: 5 MMOL/L (ref 7–16)
AST SERPL-CCNC: 16 U/L (ref 15–37)
BASOPHILS # BLD: 0 K/UL (ref 0–0.2)
BASOPHILS NFR BLD: 0 % (ref 0–2)
BILIRUB SERPL-MCNC: 0.3 MG/DL (ref 0.2–1.1)
BUN SERPL-MCNC: 33 MG/DL (ref 8–23)
CALCIUM SERPL-MCNC: 9.3 MG/DL (ref 8.3–10.4)
CHLORIDE SERPL-SCNC: 105 MMOL/L (ref 98–107)
CO2 SERPL-SCNC: 31 MMOL/L (ref 21–32)
CREAT SERPL-MCNC: 1.6 MG/DL (ref 0.6–1)
DIFFERENTIAL METHOD BLD: NORMAL
EOSINOPHIL # BLD: 0.3 K/UL (ref 0–0.8)
EOSINOPHIL NFR BLD: 5 % (ref 0.5–7.8)
ERYTHROCYTE [DISTWIDTH] IN BLOOD BY AUTOMATED COUNT: 13.1 % (ref 11.9–14.6)
GLOBULIN SER CALC-MCNC: 3.8 G/DL (ref 2.3–3.5)
GLUCOSE SERPL-MCNC: 90 MG/DL (ref 65–100)
HCT VFR BLD AUTO: 39.8 % (ref 35.8–46.3)
HGB BLD-MCNC: 12.7 G/DL (ref 11.7–15.4)
IMM GRANULOCYTES # BLD AUTO: 0 K/UL (ref 0–0.5)
IMM GRANULOCYTES NFR BLD AUTO: 0 % (ref 0–5)
LYMPHOCYTES # BLD: 1.4 K/UL (ref 0.5–4.6)
LYMPHOCYTES NFR BLD: 20 % (ref 13–44)
MCH RBC QN AUTO: 29.7 PG (ref 26.1–32.9)
MCHC RBC AUTO-ENTMCNC: 31.9 G/DL (ref 31.4–35)
MCV RBC AUTO: 93 FL (ref 79.6–97.8)
MONOCYTES # BLD: 0.6 K/UL (ref 0.1–1.3)
MONOCYTES NFR BLD: 8 % (ref 4–12)
NEUTS SEG # BLD: 4.7 K/UL (ref 1.7–8.2)
NEUTS SEG NFR BLD: 67 % (ref 43–78)
NRBC # BLD: 0 K/UL (ref 0–0.2)
PLATELET # BLD AUTO: 166 K/UL (ref 150–450)
PMV BLD AUTO: 9.4 FL (ref 9.4–12.3)
POTASSIUM SERPL-SCNC: 3.9 MMOL/L (ref 3.5–5.1)
PROT SERPL-MCNC: 7.7 G/DL (ref 6.3–8.2)
RBC # BLD AUTO: 4.28 M/UL (ref 4.05–5.2)
SODIUM SERPL-SCNC: 141 MMOL/L (ref 136–145)
TROPONIN I BLD-MCNC: 0.01 NG/ML (ref 0.02–0.05)
WBC # BLD AUTO: 7 K/UL (ref 4.3–11.1)

## 2019-01-26 PROCEDURE — 85025 COMPLETE CBC W/AUTO DIFF WBC: CPT

## 2019-01-26 PROCEDURE — 93005 ELECTROCARDIOGRAM TRACING: CPT | Performed by: EMERGENCY MEDICINE

## 2019-01-26 PROCEDURE — 99283 EMERGENCY DEPT VISIT LOW MDM: CPT | Performed by: EMERGENCY MEDICINE

## 2019-01-26 PROCEDURE — 80053 COMPREHEN METABOLIC PANEL: CPT

## 2019-01-26 PROCEDURE — 84484 ASSAY OF TROPONIN QUANT: CPT

## 2019-01-27 LAB
ATRIAL RATE: 82 BPM
CALCULATED P AXIS, ECG09: 56 DEGREES
CALCULATED R AXIS, ECG10: 22 DEGREES
CALCULATED T AXIS, ECG11: 79 DEGREES
DIAGNOSIS, 93000: NORMAL
P-R INTERVAL, ECG05: 170 MS
Q-T INTERVAL, ECG07: 386 MS
QRS DURATION, ECG06: 88 MS
QTC CALCULATION (BEZET), ECG08: 450 MS
VENTRICULAR RATE, ECG03: 82 BPM

## 2019-01-27 NOTE — ED PROVIDER NOTES
Patient has a history of COPD for which she uses a home nebulizer and an inhaler. She states her breathing has been worse than usual over the past couple weeks. She went to an urgent care today where she had a chest x-ray done and was sent here for further evaluation. She sees [facility]. She denies any fever, has had a slight cough. She has been using her breathing treatments at home without any significant improvement. She states uses oxygen only at night, she is not sure how many liters. Elements of this note were made using speech recognition software. As such, errors of speech recognition may occur. Past Medical History:  
Diagnosis Date  Adverse effect of anesthesia \"O2 level dropped during JEANMARIE\"  Aortic stenosis   
 s/p AVR 2015 with Dr. Laura Grace  Asthma   
 controlled per pt  Atrial fibrillation ([facility] after AVR 2015 for short duration - no evidence of recurrence per card note 16  Chronic sinusitis  COPD (chronic obstructive pulmonary disease) ([facility]) Per pulm note 11/15/16: \"Severe COPD now very well controlled on ICS/LABA, LAMA and CLINT (no use in last month, no exacerbations since last seen). Aly Oxford Aly Oxford \"  
 Depression  Heart murmur  High cholesterol  Hypertension  Kidney stones  Osteoporosis  PVD (peripheral vascular disease) ([address]   
 per card note 16  Thyroid disease  Tinnitus of both ears  Varicose vein of leg Past Surgical History:  
Procedure Laterality Date  HX AORTIC VALVE REPLACEMENT  May 2015  HX BREAST LUMPECTOMY  1970  
 benign  HX  SECTION    
 x3  
 HX CHOLECYSTECTOMY  2016  HX UROLOGICAL    
 kidney stone  THYROIDECTOMY  1970  
 partial, 2/2 goiter Family History:  
Problem Relation Age of Onset  Heart Disease Mother  Heart Disease Father  Heart Disease Sister Social History Socioeconomic History  Marital status: SINGLE  
 Spouse name: Not on file  Number of children: Not on file  Years of education: Not on file  Highest education level: Not on file Social Needs  Financial resource strain: Not on file  Food insecurity - worry: Not on file  Food insecurity - inability: Not on file  Transportation needs - medical: Not on file  Transportation needs - non-medical: Not on file Occupational History  Not on file Tobacco Use  Smoking status: Former Smoker Packs/day: 1.00 Years: 20.00 Pack years: 20.00 Types: Cigarettes Last attempt to quit: 1990 Years since quittin.5  Smokeless tobacco: Never Used Substance and Sexual Activity  Alcohol use: No  
 Drug use: No  
 Sexual activity: Not on file Other Topics Concern  Not on file Social History Narrative Pt lives alone - completely independent Has 2 daughters relatively close by ALLERGIES: Sulfa (sulfonamide antibiotics) Review of Systems Constitutional: Negative for chills and fever. Respiratory: Positive for cough, shortness of breath and wheezing. Gastrointestinal: Negative for nausea and vomiting. All other systems reviewed and are negative. Vitals:  
 19 2115 19 2122 BP: 161/78 Pulse: 88 Resp: 18 Temp: 97.7 °F (36.5 °C) SpO2: (!) 88% 96% Weight: 81.6 kg (180 lb) Height: 5' 4\" (1.626 m) Physical Exam  
Constitutional: She is oriented to person, place, and time. She appears well-developed and well-nourished. HENT:  
Head: Normocephalic and atraumatic. Eyes: Conjunctivae are normal. Pupils are equal, round, and reactive to light. Neck: Normal range of motion. Neck supple. Pulmonary/Chest: Effort normal. No respiratory distress. She has rales. Bibasilar rales Abdominal: Soft. Bowel sounds are normal.  
Musculoskeletal: She exhibits no edema or tenderness. Neurological: She is alert and oriented to person, place, and time. Skin: Skin is warm and dry. Psychiatric: She has a normal mood and affect. Her behavior is normal.  
Nursing note and vitals reviewed. MDM Number of Diagnoses or Management Options Diagnosis management comments: 11:13 PM chest x-ray from urgent care was reviewed, shows some possible basilar atelectasis. O2 sat is 92% on RA with no dyspnea. Spoke with Dr Diaz, also of case. He does not recommend antibiotics or further steroids given that she is not wheezing. I discussed this with the patient, she appears comfortable and in no distress. The plan is to have her follow-up with Dr. Margarita Comer as an outpatient. Amount and/or Complexity of Data Reviewed Clinical lab tests: ordered and reviewed Tests in the radiology section of CPT®: reviewed Decide to obtain previous medical records or to obtain history from someone other than the patient: yes Obtain history from someone other than the patient: yes Review and summarize past medical records: yes Discuss the patient with other providers: yes Risk of Complications, Morbidity, and/or Mortality Presenting problems: moderate Diagnostic procedures: moderate Management options: moderate Patient Progress Patient progress: stable Procedures

## 2019-01-27 NOTE — ED TRIAGE NOTES
Pt walked into triage with daughter. Pt states she is sob with copd. Pt was sent from [facility] urgent care where they did a chest xray which showed possible atelectasis in the left lung base. Disc with pt. C/o chest tightness

## 2019-01-27 NOTE — DISCHARGE INSTRUCTIONS
Follow-up with Dr. Mainor Brooks, call his office to make an appointment. Return to the emergency department if your symptoms worsen.

## 2019-01-27 NOTE — ED NOTES
I have reviewed discharge instructions with the patient. The patient verbalized understanding. Patient left ED via Discharge Method: ambulatory to Home with daughter. Opportunity for questions and clarification provided. Patient given 0 scripts. To continue your aftercare when you leave the hospital, you may receive an automated call from our care team to check in on how you are doing. This is a free service and part of our promise to provide the best care and service to meet your aftercare needs.  If you have questions, or wish to unsubscribe from this service please call [phone]. Thank you for Choosing our [facility] Emergency Department.

## 2019-03-15 ENCOUNTER — HOSPITAL ENCOUNTER (OUTPATIENT)
Dept: LAB | Age: 79
Discharge: HOME | End: 2019-03-15
Payer: MEDICARE

## 2019-03-15 DIAGNOSIS — N17.9 ACUTE KIDNEY INJURY (HCC): ICD-10-CM

## 2019-03-15 LAB
ANION GAP SERPL CALC-SCNC: 3 MMOL/L
BUN SERPL-MCNC: 24 MG/DL (ref 8–23)
CALCIUM SERPL-MCNC: 8.8 MG/DL (ref 8.3–10.4)
CHLORIDE SERPL-SCNC: 110 MMOL/L (ref 98–107)
CO2 SERPL-SCNC: 30 MMOL/L (ref 21–32)
CREAT SERPL-MCNC: 0.8 MG/DL (ref 0.6–1)
GLUCOSE SERPL-MCNC: 85 MG/DL (ref 65–100)
POTASSIUM SERPL-SCNC: 4.3 MMOL/L (ref 3.5–5.1)
SODIUM SERPL-SCNC: 143 MMOL/L (ref 136–145)

## 2019-03-15 PROCEDURE — 80048 BASIC METABOLIC PNL TOTAL CA: CPT

## 2019-03-15 PROCEDURE — 36415 COLL VENOUS BLD VENIPUNCTURE: CPT

## 2020-03-05 ENCOUNTER — HOSPITAL ENCOUNTER (OUTPATIENT)
Dept: MAMMOGRAPHY | Age: 80
Discharge: HOME | End: 2020-03-05
Attending: FAMILY MEDICINE
Payer: MEDICARE

## 2020-03-05 DIAGNOSIS — Z78.0 POSTMENOPAUSAL ESTROGEN DEFICIENCY: ICD-10-CM

## 2020-03-05 PROCEDURE — 77080 DXA BONE DENSITY AXIAL: CPT

## 2020-08-26 ENCOUNTER — HOSPITAL ENCOUNTER (OUTPATIENT)
Dept: INFUSION THERAPY | Age: 80
Discharge: HOME | End: 2020-08-26

## 2022-03-18 PROBLEM — Z95.2 STATUS POST AORTIC VALVE REPLACEMENT: Status: ACTIVE | Noted: 2018-11-15

## 2022-03-19 PROBLEM — I10 ESSENTIAL HYPERTENSION: Status: ACTIVE | Noted: 2017-08-29

## 2022-03-19 PROBLEM — I87.2 CHRONIC VENOUS STASIS DERMATITIS OF BOTH LOWER EXTREMITIES: Status: ACTIVE | Noted: 2018-11-15

## 2022-03-19 PROBLEM — G47.34 NOCTURNAL HYPOXEMIA: Status: ACTIVE | Noted: 2018-04-04

## 2022-06-14 PROBLEM — Z95.2 STATUS POST AORTIC VALVE REPLACEMENT: Status: ACTIVE | Noted: 2018-11-15

## 2022-06-27 ENCOUNTER — OFFICE VISIT (OUTPATIENT)
Dept: CARDIOLOGY CLINIC | Age: 82
End: 2022-06-27
Payer: MEDICARE

## 2022-06-27 VITALS
WEIGHT: 168 LBS | SYSTOLIC BLOOD PRESSURE: 138 MMHG | HEART RATE: 76 BPM | DIASTOLIC BLOOD PRESSURE: 72 MMHG | HEIGHT: 63 IN | BODY MASS INDEX: 29.77 KG/M2

## 2022-06-27 DIAGNOSIS — J44.9 COPD, SEVERE (HCC): ICD-10-CM

## 2022-06-27 DIAGNOSIS — Z95.2 STATUS POST AORTIC VALVE REPLACEMENT: Primary | ICD-10-CM

## 2022-06-27 DIAGNOSIS — I10 ESSENTIAL HYPERTENSION: ICD-10-CM

## 2022-06-27 PROCEDURE — 1036F TOBACCO NON-USER: CPT | Performed by: INTERNAL MEDICINE

## 2022-06-27 PROCEDURE — 1090F PRES/ABSN URINE INCON ASSESS: CPT | Performed by: INTERNAL MEDICINE

## 2022-06-27 PROCEDURE — 1123F ACP DISCUSS/DSCN MKR DOCD: CPT | Performed by: INTERNAL MEDICINE

## 2022-06-27 PROCEDURE — G8399 PT W/DXA RESULTS DOCUMENT: HCPCS | Performed by: INTERNAL MEDICINE

## 2022-06-27 PROCEDURE — G8427 DOCREV CUR MEDS BY ELIG CLIN: HCPCS | Performed by: INTERNAL MEDICINE

## 2022-06-27 PROCEDURE — G8417 CALC BMI ABV UP PARAM F/U: HCPCS | Performed by: INTERNAL MEDICINE

## 2022-06-27 PROCEDURE — 99214 OFFICE O/P EST MOD 30 MIN: CPT | Performed by: INTERNAL MEDICINE

## 2022-06-27 PROCEDURE — 3023F SPIROM DOC REV: CPT | Performed by: INTERNAL MEDICINE

## 2022-06-27 ASSESSMENT — ENCOUNTER SYMPTOMS: SHORTNESS OF BREATH: 1

## 2022-06-27 NOTE — PROGRESS NOTES
[facility] CARDIOLOGY  [address], [address]  PHONE: [phone]      22    NAME:  Edouard Kee  : 1940  MRN: [id number]         SUBJECTIVE:   Edouard Kee is a 79 y.o. female seen for a follow up visit regarding the following:     Chief Complaint   Patient presents with    Results     echo            HPI:  Follow up  Results (echo)   . Follow up bioprosthetic AVR, h/o orthostatic hypotension. 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 Lost her daughter last year to aortic dissection.   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 She's not had any issues to report, says she is always a little bit short of breath but that it seems stable, able to do her housework. Her pulmonologist recommended using her oxygen  but she hasn't been. [place] better so thought she might not need it.        Past cardiac history:   Bioprosthetic 2015 - EF 60-65%, abnormal DF, normal AVR, RVSP - normal SF, DF, normal AVR, RVSP - EF 60-65%, abn DF, AVR with possible stenosis, peak gradient 3.4 m/s, mean gradient 27, RVSP 38, mild to mod MR                Past Medical History, Past Surgical History, Family history, Social History, and Medications were all reviewed with the patient today and updated as necessary. Prior to Admission medications    Medication Sig Start Date End Date Taking?  Authorizing Provider   OXYGEN 4 lpm qhs   Yes Ar Automatic Reconciliation   albuterol sulfate  (90 Base) MCG/ACT inhaler Inhale 2 puffs into the lungs every 4 hours as needed   Yes Ar Automatic Reconciliation   albuterol (PROVENTIL) (2.5 MG/3ML) 0.083% nebulizer solution Inhale 2.5 mg into the lungs 4 times daily 18  Yes Ar Automatic Reconciliation   aspirin 81 MG EC tablet Take 81 mg by mouth daily   Yes Ar Automatic Reconciliation   azelastine (ASTELIN) 0.1 % nasal spray 2 sprays by Nasal route 2 times daily 6/11/20  Yes Ar Automatic Reconciliation   carvedilol (COREG) 6.25 MG tablet Take 6.25 mg by mouth 2 times daily 12/22/21  Yes Ar Automatic Reconciliation   famotidine (PEPCID) 20 MG tablet Take 20 mg by mouth 2 times daily 2/22/21  Yes Ar Automatic Reconciliation   fexofenadine-pseudoephedrine (ALLEGRA-D 24HR) 180-240 MG per extended release tablet Take 1 tablet by mouth daily 7/14/16  Yes Ar Automatic Reconciliation   fluticasone-vilanterol (BREO ELLIPTA) 100-25 MCG/INH AEPB inhaler Inhale 1 puff into the lungs daily 2/13/18  Yes Ar Automatic Reconciliation   fluticasone (FLONASE) 50 MCG/ACT nasal spray 2 sprays by Nasal route daily 6/11/20  Yes Ar Automatic Reconciliation   furosemide (LASIX) 40 MG tablet Take 40 mg by mouth daily as needed 1/2/16  Yes Ar Automatic Reconciliation   losartan (COZAAR) 25 MG tablet TAKE ONE TABLET BY MOUTH ONE TIME DAILY 3/2/22  Yes Ar Automatic Reconciliation   montelukast (SINGULAIR) 10 MG tablet Take 10 mg by mouth daily   Yes Ar Automatic Reconciliation   pravastatin (PRAVACHOL) 40 MG tablet Take 40 mg by mouth 5/18/20  Yes Ar Automatic Reconciliation   sertraline (ZOLOFT) 100 MG tablet Take 100 mg by mouth daily 8/6/20  Yes Ar Automatic Reconciliation   tiotropium (SPIRIVA RESPIMAT) 2.5 MCG/ACT AERS inhaler Inhale 2 puffs into the lungs daily 2/28/19  Yes Ar Automatic Reconciliation     Allergies   Allergen Reactions    Sulfa Antibiotics Rash     Past Medical History:   Diagnosis Date    Adverse effect of anesthesia     \"O2 level dropped during MARIE\"    Aortic stenosis 2015    s/p AVR 5/2015 with Dr. Edie Lara Atrial fibrillation [facility])     after AVR 5/2015 for short duration - no evidence of recurrence per card note 9/14/16    Chronic sinusitis     COPD (chronic obstructive pulmonary disease) ([facility] [address]     Per pulm note 11/15/16: \"Severe COPD now very well controlled on ICS/LABA, LAMA and CYNTHIA (no use in last month, no exacerbations since last seen). Roxanna Driscoll Blanchie Bevels \"   Blase Liming Depression     GERD (gastroesophageal reflux disease)     pepcid    Heart murmur     High cholesterol     Hypertension     Kidney stones     Osteoporosis 2020    PVD (peripheral vascular disease) ([facility] [address]     per card note 16    Thyroid disease     Tinnitus of both ears     Varicose vein of leg      Past Surgical History:   Procedure Laterality Date    AORTIC VALVE REPLACEMENT  May 2015    BREAST LUMPECTOMY  1970    benign     SECTION      x3    CHOLECYSTECTOMY  2016    THYROIDECTOMY  1970    partial, 2/2 goiter    UROLOGICAL SURGERY      kidney stone     Family History   Problem Relation Age of Onset    Heart Disease Mother     Heart Disease Father     Heart Disease Sister      Social History     Tobacco Use    Smoking status: Former Smoker     Packs/day: 1.00     Quit date: 1990     Years since quittin.9    Smokeless tobacco: Never Used   Substance Use Topics    Alcohol use: No       ROS:    Review of Systems   Cardiovascular: Negative for chest pain and near-syncope. Respiratory: Positive for shortness of breath. PHYSICAL EXAM:   /72   Pulse 76   Ht 5' 3\" (1.6 m)   Wt 168 lb (76.2 kg) Comment: with shoes  BMI 29.76 kg/m²      Wt Readings from Last 3 Encounters:   22 168 lb (76.2 kg)   22 168 lb (76.2 kg)   21 168 lb (76.2 kg)     BP Readings from Last 3 Encounters:   22 138/72   22 (!) 140/80   21 (!) 156/76     Pulse Readings from Last 3 Encounters:   22 76   21 70   21 71           Physical Exam  Constitutional:       Appearance: Normal appearance. HENT:      Head: Normocephalic and atraumatic. Eyes:      Conjunctiva/sclera: Conjunctivae normal.   Neck:      Vascular: No carotid bruit. Cardiovascular:      Rate and Rhythm: Normal rate and regular rhythm. Heart sounds: No murmur heard. No friction rub. No gallop. Pulmonary:      Effort: No respiratory distress.       Breath sounds: No wheezing or rales. Musculoskeletal:         General: No swelling. Cervical back: Neck supple. Skin:     General: Skin is warm and dry. Neurological:      General: No focal deficit present. Mental Status: She is alert. Psychiatric:         Mood and Affect: Mood normal.         Behavior: Behavior normal.         Medical problems and test results were reviewed with the patient today. DATA REVIEW          6/1/22 1050     Triglycerides <150 mg/dL 65    Cholesterol <200 mg/dL 199    HDL Cholesterol >59 mg/dL 79    LDL, Calculated <130 mg/dL 107    Comment: Desirable: < 130 mg/dL; Borderline 130 - 159 mg/dL   VLDL Cholesterol <40 mg/dL 13    Chol/HDL Ratio <3.5 2.5    Non-HDL Cholesterol <130 mg/dL 120      6/1/22 1050     WBC 3.5 - 10.8 K/uL 6.2    RBC 3.86 - 5.35 M/uL 4.14    Hemoglobin 11.0 - 15.4 g/dL 11.6    Hematocrit 35.6 - 47.3 % 37.8    MCV 79.0 - 100.0 fL 91.3    MCH 23.7 - 32.9 pg 28.0    MCHC 30.0 - 36.5 g/dL 30.7    RDW-CV 11.6 - 16.0 % 13.2    Platelets 964 - 618 K/uL 190      6/1/22 1050     TSH 0.350 - 4.940 uIU/mL 2. 193        LIPID PANEL     Lab Results   Component Value Date    CHOL 207 (H) 07/28/2020     Lab Results   Component Value Date    TRIG 59 07/28/2020     Lab Results   Component Value Date    HDL 92 07/28/2020     Lab Results   Component Value Date    LDLCALC 103 (H) 07/28/2020     Lab Results   Component Value Date    LABVLDL 12 07/28/2020     No results found for: CHOLHDLRATIO    BMP  Lab Results   Component Value Date     07/28/2020    K 4.2 07/28/2020     07/28/2020    CO2 24 07/28/2020    BUN 24 07/28/2020    CREATININE 0.81 07/28/2020    GLUCOSE 89 07/28/2020    CALCIUM 9.1 07/28/2020          EKG        CXR/IMAGING        DEVICE INTERROGATION        OUTSIDE RECORDS REVIEW    Records from outside providers have been reviewed and summarized as noted in the HPI, past history and data review sections of this note, and reviewed with patient. Myles Garner

## 2022-08-18 NOTE — TELEPHONE ENCOUNTER
Medication, strength, directions and requested pharmacy reviewed/verified. Prescription pending physician's approval. Request received via fax.

## 2022-08-19 RX ORDER — LOSARTAN POTASSIUM 25 MG/1
25 TABLET ORAL DAILY
Qty: 90 TABLET | Refills: 3 | Status: SHIPPED | OUTPATIENT
Start: 2022-08-19

## 2022-08-19 RX ORDER — PRAVASTATIN SODIUM 40 MG
40 TABLET ORAL DAILY
Qty: 90 TABLET | Refills: 3 | Status: SHIPPED | OUTPATIENT
Start: 2022-08-19

## 2022-08-19 RX ORDER — CARVEDILOL 6.25 MG/1
6.25 TABLET ORAL 2 TIMES DAILY
Qty: 180 TABLET | Refills: 3 | Status: SHIPPED | OUTPATIENT
Start: 2022-08-19

## 2022-12-28 NOTE — PROGRESS NOTES
[facility] CARDIOLOGY  [address], [address]  PHONE: [phone]      22    NAME:  Renae Amos  : 1940  MRN: [id number]         SUBJECTIVE:   Renae Amos is a 79 y.o. female seen for a follow up visit regarding the following:     Chief Complaint   Patient presents with    Hypertension              HPI:  Follow up  Hypertension   . Follow up bioprosthetic AVR with progressive stenosis, h/o orthostatic hypotension. 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 Lost her daughter previously to aortic dissection. She had bad case of flu about a month ago. She's remained short of breath without palpitations but appears to be in afib today. Past cardiac history:   Bioprosthetic 2015 - EF 60-65%, abnormal DF, normal AVR, RVSP - normal SF, DF, normal AVR, RVSP - EF 60-65%, abn DF, AVR with possible stenosis, peak gradient 3.4 m/s, mean gradient 27, RVSP 38, mild to mod MR             [facility] CAD CHF Meds            apixaban (ELIQUIS) 5 MG TABS tablet (Taking)    Sig - Route: Take 1 tablet by mouth 2 times daily - Oral    carvedilol (COREG) 6.25 MG tablet (Taking)    Sig - Route: Take 1 tablet by mouth 2 times daily - Oral    losartan (COZAAR) 25 MG tablet (Taking)    Sig - Route: Take 1 tablet by mouth daily TAKE ONE TABLET BY MOUTH ONE TIME DAILY - Oral    pravastatin (PRAVACHOL) 40 MG tablet (Taking)    Sig - Route: Take 1 tablet by mouth daily - Oral    furosemide (LASIX) 40 MG tablet (Taking)    Class: Historical Med          Key Antihyperglycemic Medications       Patient is on no antihyperglycemic meds. Past Medical History, Past Surgical History, Family history, Social History, and Medications were all reviewed with the patient today and updated as necessary. Prior to Admission medications    Medication Sig Start Date End Date Taking?  Authorizing Provider   apixaban (ELIQUIS) 5 MG TABS tablet Take 1 tablet by mouth 2 times daily 12/29/22  Yes Mame Carvalho MD   carvedilol (COREG) 6.25 MG tablet Take 1 tablet by mouth 2 times daily 8/19/22  Yes Mame Carvalho MD   losartan (COZAAR) 25 MG tablet Take 1 tablet by mouth daily TAKE ONE TABLET BY MOUTH ONE TIME DAILY 8/19/22  Yes aMme Carvalho MD   pravastatin (PRAVACHOL) 40 MG tablet Take 1 tablet by mouth daily 8/19/22  Yes Mame Carvalho MD   OXYGEN 4 lpm qhs   Yes Ar Automatic Reconciliation   albuterol sulfate  (90 Base) MCG/ACT inhaler Inhale 2 puffs into the lungs every 4 hours as needed   Yes Ar Automatic Reconciliation   albuterol (PROVENTIL) (2.5 MG/3ML) 0.083% nebulizer solution Inhale 2.5 mg into the lungs 4 times daily 6/26/18  Yes Ar Automatic Reconciliation   famotidine (PEPCID) 20 MG tablet Take 20 mg by mouth 2 times daily 2/22/21  Yes Ar Automatic Reconciliation   fexofenadine-pseudoephedrine (ALLEGRA-D 24HR) 180-240 MG per extended release tablet Take 1 tablet by mouth daily 7/14/16  Yes Ar Automatic Reconciliation   fluticasone-vilanterol (BREO ELLIPTA) 100-25 MCG/INH AEPB inhaler Inhale 1 puff into the lungs daily 2/13/18  Yes Ar Automatic Reconciliation   fluticasone (FLONASE) 50 MCG/ACT nasal spray 2 sprays by Nasal route daily 6/11/20  Yes Ar Automatic Reconciliation   furosemide (LASIX) 40 MG tablet Take 40 mg by mouth daily as needed 1/2/16  Yes Ar Automatic Reconciliation   montelukast (SINGULAIR) 10 MG tablet Take 10 mg by mouth daily   Yes Ar Automatic Reconciliation   sertraline (ZOLOFT) 100 MG tablet Take 100 mg by mouth daily 8/6/20  Yes Ar Automatic Reconciliation   tiotropium (SPIRIVA RESPIMAT) 2.5 MCG/ACT AERS inhaler Inhale 2 puffs into the lungs daily 2/28/19  Yes Ar Automatic Reconciliation     Allergies   Allergen Reactions    Sulfa Antibiotics Rash     Past Medical History:   Diagnosis Date    Adverse effect of anesthesia     \"O2 level dropped during MARIE\"    Aortic stenosis 2015 s/p AVR 2015 with Dr. Johnna Apodaca fibrillation [facility])     after AVR 2015 for short duration - no evidence of recurrence per card note 16    Chronic sinusitis     COPD (chronic obstructive pulmonary disease) ([facility] [address]     Per pulm note 11/15/16: \"Severe COPD now very well controlled on ICS/LABA, LAMA and CYNTHIA (no use in last month, no exacerbations since last seen). [place] \"    Depression     GERD (gastroesophageal reflux disease)     pepcid    Heart murmur     High cholesterol     Hypertension     Kidney stones     Osteoporosis     PVD (peripheral vascular disease) ([facility] [address]     per card note 16    Thyroid disease     Tinnitus of both ears     Varicose vein of leg      Past Surgical History:   Procedure Laterality Date    AORTIC VALVE REPLACEMENT  May 2015    BREAST LUMPECTOMY  1970    benign     SECTION      x3    CHOLECYSTECTOMY  2016    THYROIDECTOMY  1970    partial, 2/2 goiter    UROLOGICAL SURGERY      kidney stone     Family History   Problem Relation Age of Onset    Heart Disease Mother     Heart Disease Father     Heart Disease Sister      Social History     Tobacco Use    Smoking status: Former     Packs/day: 1.00     Types: Cigarettes     Quit date: 1990     Years since quittin.4    Smokeless tobacco: Never   Substance Use Topics    Alcohol use: No       ROS:    Review of Systems   Cardiovascular:  Negative for chest pain, leg swelling and palpitations. Respiratory:  Positive for shortness of breath.          PHYSICAL EXAM:   /70   Pulse 94   Ht 5' 3\" (1.6 m)   Wt 166 lb 3.2 oz (75.4 kg) Comment: without shoes  BMI 29.44 kg/m²      Wt Readings from Last 3 Encounters:   22 166 lb 3.2 oz (75.4 kg)   22 168 lb (76.2 kg)   22 168 lb (76.2 kg)     BP Readings from Last 3 Encounters:   22 132/70   22 138/72   22 (!) 140/80     Pulse Readings from Last 3 Encounters:   22 94   22 76   21 70           Physical Exam  Constitutional:       Appearance: Normal appearance. HENT:      Head: Normocephalic and atraumatic. Eyes:      Conjunctiva/sclera: Conjunctivae normal.   Neck:      Vascular: No carotid bruit. Cardiovascular:      Rate and Rhythm: Normal rate. Rhythm irregular. Heart sounds: No murmur heard. No friction rub. No gallop. Pulmonary:      Effort: No respiratory distress. Breath sounds: No wheezing or rales. Musculoskeletal:         General: No swelling. Cervical back: Neck supple. Skin:     General: Skin is warm and dry. Neurological:      General: No focal deficit present. Mental Status: She is alert. Psychiatric:         Mood and Affect: Mood normal.         Behavior: Behavior normal.       Medical problems and test results were reviewed with the patient today. DATA REVIEW    LIPID PANEL     Lab Results   Component Value Date    CHOL 207 (H) 07/28/2020     Lab Results   Component Value Date    TRIG 59 07/28/2020     Lab Results   Component Value Date    HDL 92 07/28/2020     Lab Results   Component Value Date    LDLCALC 103 (H) 07/28/2020     Lab Results   Component Value Date    LABVLDL 12 07/28/2020     No results found for: CHOLHDLRATIO    BMP  Lab Results   Component Value Date/Time     07/28/2020 02:11 PM    K 4.2 07/28/2020 02:11 PM     07/28/2020 02:11 PM    CO2 24 07/28/2020 02:11 PM    BUN 24 07/28/2020 02:11 PM    CREATININE 0.81 07/28/2020 02:11 PM    GLUCOSE 89 07/28/2020 02:11 PM    CALCIUM 9.1 07/28/2020 02:11 PM       6/1/22 1050    Triglycerides <150 mg/dL 65    Cholesterol <200 mg/dL 199    HDL Cholesterol >59 mg/dL 79    LDL, Calculated <130 mg/dL 107    Comment: Desirable: < 130 mg/dL;  Borderline 130 - 159 mg/dL   VLDL Cholesterol <40 mg/dL 13    Chol/HDL Ratio <3.5 2.5    Non-HDL Cholesterol <130 mg/dL 120       6/1/22 1050    WBC 3.5 - 10.8 K/uL 6.2    RBC 3.86 - 5.35 M/uL 4.14    Hemoglobin 11.0 - 15.4 g/dL 11.6    Hematocrit 35.6 - 47.3 % 37.8    MCV 79.0 - 100.0 fL 91.3    MCH 23.7 - 32.9 pg 28.0    MCHC 30.0 - 36.5 g/dL 30.7    RDW-CV 11.6 - 16.0 % 13.2    Platelets 918 - 168 K/uL 190       6/1/22 1050    TSH 0.350 - 4.940 uIU/mL 2.193       6/1/22 1050    Sodium 136 - 145 mmol/L 141    Potassium 3.5 - 5.1 mmol/L 4.5    Chloride 98 - 107 mmol/L 106    CO2 20 - 30 mmol/L 27    Anion Gap 6 - 16 mmol/L 8    BUN 7 - 20 mg/dL 24 High     Creatinine 0.57 - 1.11 mg/dL 0.75    Glucose 70 - 99 mg/dL 95    Calcium 8.5 - 10.4 mg/dL 9.7    AST 5 - 34 IU/L 15    ALT <55 IU/L 13    Alkaline Phosphatase 40 - 150 IU/L 83    Protein, Total 6.2 - 8.3 g/dL 7.1    Albumin 3.4 - 4.8 g/dL 3.4    Bilirubin, Total 0.1 - 1.2 mg/dL 0.3    eGFR >59 mL/min/1.73 m2 79        EKG    Afib with controlled ventricular response 94  normal axis, intervals, ST and T waves      CXR/IMAGING        DEVICE INTERROGATION        OUTSIDE RECORDS REVIEW    Records from outside providers have been reviewed and summarized as noted in the HPI, past history and data review sections of this note, and reviewed with patient. .       ASSESSMENT and PLAN    Torin De was seen today for hypertension. Diagnoses and all orders for this visit:    Stenosis of prosthetic aortic valve, subsequent encounter  -     EKG 12 Lead    Essential hypertension    COPD, severe (HCC)    High cholesterol    Persistent atrial fibrillation (HCC)  -     Transthoracic echocardiogram (TTE) complete with contrast, bubble, strain, and 3D PRN; Future  -     Cardiac holter monitor (<= 48 hours); Future    Other orders  -     apixaban (ELIQUIS) 5 MG TABS tablet; Take 1 tablet by mouth 2 times daily        IMPRESSION:    Newly identified afib, on moderate dose BB currently. Orginally, we discussed and I recommended MARIE guided cardioversion to restore SR. However, patient relates that some years ago for endoscopy by GI she had adverse reaction to the conscious sedation because of her COPD.   Therefore, in her case, the better part of valor is to pursue rate control and stroke prophylaxis, given high risk for procedures, though I think restoration of SR might help with dyspnea to restore atrial kick. She prefers the more conservative approach    Therefore, will get 48 hr monitor. If she has periods of sinus rhythm and her follow up echo looks well, consider AAD. Discussed indications, benefits and risks of anticoagulation with patient. Risks include, but are not limited to minor and major bleeding for which there may not be an immediate antidote other than supportive care. The risk of fatal bleeding is rare but present. Patient is advised to stop all aspirin and aspirin containing products and NSAID's (such as ibuprofen, Aleve, Naprosyn), and is invited to call should he or she have questions regarding the safety of over the counter medications, and instructed to call should anyone wish to interrupt the medication for any reason. A mild increase in  bruising is to be expected. Call should concerning signs or symptoms of abnormal bleeding be experienced. Return for AFTER PROCEDURE TO REVIEW RESULTS. Thank you for allowing me to participate in this patient's care. Please call or contact me if there are any questions or concerns regarding the above.       Scott Mark MD  12/29/22  4:22 PM

## 2022-12-29 ENCOUNTER — OFFICE VISIT (OUTPATIENT)
Dept: CARDIOLOGY CLINIC | Age: 82
End: 2022-12-29
Payer: MEDICARE

## 2022-12-29 VITALS
WEIGHT: 166.2 LBS | DIASTOLIC BLOOD PRESSURE: 70 MMHG | SYSTOLIC BLOOD PRESSURE: 132 MMHG | HEIGHT: 63 IN | BODY MASS INDEX: 29.45 KG/M2 | HEART RATE: 94 BPM

## 2022-12-29 DIAGNOSIS — J44.9 COPD, SEVERE (HCC): ICD-10-CM

## 2022-12-29 DIAGNOSIS — T82.857D STENOSIS OF PROSTHETIC AORTIC VALVE, SUBSEQUENT ENCOUNTER: Primary | ICD-10-CM

## 2022-12-29 DIAGNOSIS — I48.19 PERSISTENT ATRIAL FIBRILLATION (HCC): ICD-10-CM

## 2022-12-29 DIAGNOSIS — E78.00 HIGH CHOLESTEROL: ICD-10-CM

## 2022-12-29 DIAGNOSIS — I10 ESSENTIAL HYPERTENSION: ICD-10-CM

## 2022-12-29 PROCEDURE — G8417 CALC BMI ABV UP PARAM F/U: HCPCS | Performed by: INTERNAL MEDICINE

## 2022-12-29 PROCEDURE — G8399 PT W/DXA RESULTS DOCUMENT: HCPCS | Performed by: INTERNAL MEDICINE

## 2022-12-29 PROCEDURE — 99214 OFFICE O/P EST MOD 30 MIN: CPT | Performed by: INTERNAL MEDICINE

## 2022-12-29 PROCEDURE — 3023F SPIROM DOC REV: CPT | Performed by: INTERNAL MEDICINE

## 2022-12-29 PROCEDURE — G8427 DOCREV CUR MEDS BY ELIG CLIN: HCPCS | Performed by: INTERNAL MEDICINE

## 2022-12-29 PROCEDURE — 1036F TOBACCO NON-USER: CPT | Performed by: INTERNAL MEDICINE

## 2022-12-29 PROCEDURE — 1123F ACP DISCUSS/DSCN MKR DOCD: CPT | Performed by: INTERNAL MEDICINE

## 2022-12-29 PROCEDURE — G8484 FLU IMMUNIZE NO ADMIN: HCPCS | Performed by: INTERNAL MEDICINE

## 2022-12-29 PROCEDURE — 3078F DIAST BP <80 MM HG: CPT | Performed by: INTERNAL MEDICINE

## 2022-12-29 PROCEDURE — 1090F PRES/ABSN URINE INCON ASSESS: CPT | Performed by: INTERNAL MEDICINE

## 2022-12-29 PROCEDURE — 93000 ELECTROCARDIOGRAM COMPLETE: CPT | Performed by: INTERNAL MEDICINE

## 2022-12-29 PROCEDURE — 3075F SYST BP GE 130 - 139MM HG: CPT | Performed by: INTERNAL MEDICINE

## 2022-12-29 ASSESSMENT — ENCOUNTER SYMPTOMS: SHORTNESS OF BREATH: 1

## 2022-12-29 NOTE — PATIENT INSTRUCTIONS
Patient Education        Atrial Fibrillation: Care Instructions  Your Care Instructions     Atrial fibrillation is an irregular and often fast heartbeat. Treating this condition is important for several reasons. It can cause blood clots, which can travel from your heart to your brain and cause a stroke. If you have a fast heartbeat, you may feel lightheaded, dizzy, and weak. An irregular heartbeat can also increase your risk for heart failure. Atrial fibrillation is often the result of another heart condition, such as high blood pressure or coronary artery disease. Making changes to improve your heart condition will help you stay healthy and active. Follow-up care is a key part of your treatment and safety. Be sure to make and go to all appointments, and call your doctor if you are having problems. It's also a good idea to know your test results and keep a list of the medicines you take. How can you care for yourself at home? Medicines    Take your medicines exactly as prescribed. Call your doctor if you think you are having a problem with your medicine. You will get more details on the specific medicines your doctor prescribes. If your doctor has given you a blood thinner to prevent a stroke, be sure you get instructions about how to take your medicine safely. Blood thinners can cause serious bleeding problems. Do not take any vitamins, over-the-counter drugs, or herbal products without talking to your doctor first.   Lifestyle changes    Do not smoke. Smoking can increase your chance of a stroke and heart attack. If you need help quitting, talk to your doctor about stop-smoking programs and medicines. These can increase your chances of quitting for good. Eat a heart-healthy diet. Stay at a healthy weight. Lose weight if you need to. Limit alcohol to 2 drinks a day for men and 1 drink a day for women. Too much alcohol can cause health problems.      Avoid infections such as COVID-19, colds, and the flu. Get the flu vaccine every year. Get a pneumococcal vaccine. If you have had one before, ask your doctor whether you need another dose. Stay up to date on your COVID-19 vaccines. Activity    If your doctor recommends it, get more exercise. Walking is a good choice. Bit by bit, increase the amount you walk every day. Try for at least 30 minutes on most days of the week. You also may want to swim, bike, or do other activities. Your doctor may suggest that you join a cardiac rehabilitation program so that you can have help increasing your physical activity safely. Start light exercise if your doctor says it is okay. Even a small amount will help you get stronger, have more energy, and manage stress. Walking is an easy way to get exercise. Start out by walking a little more than you did in the hospital. Gradually increase the amount you walk. When you exercise, watch for signs that your heart is working too hard. You are pushing too hard if you cannot talk while you are exercising. If you become short of breath or dizzy or have chest pain, sit down and rest immediately. Check your pulse regularly. Place two fingers on the artery at the palm side of your wrist, in line with your thumb. If your heartbeat seems uneven or fast, talk to your doctor. When should you call for help? Call 911 anytime you think you may need emergency care. For example, call if:    You have symptoms of a heart attack. These may include:  Chest pain or pressure, or a strange feeling in the chest.  Sweating. Shortness of breath. Nausea or vomiting. Pain, pressure, or a strange feeling in the back, neck, jaw, or upper belly or in one or both shoulders or arms. Lightheadedness or sudden weakness. A fast or irregular heartbeat. After you call 911, the  may tell you to chew 1 adult-strength or 2 to 4 low-dose aspirin. Wait for an ambulance. Do not try to drive yourself. You have symptoms of a stroke.  These may include:  Sudden numbness, tingling, weakness, or loss of movement in your face, arm, or leg, especially on only one side of your body. Sudden vision changes. Sudden trouble speaking. Sudden confusion or trouble understanding simple statements. Sudden problems with walking or balance. A sudden, severe headache that is different from past headaches. You passed out (lost consciousness). Call your doctor now or seek immediate medical care if:    You have new or increased shortness of breath. You feel dizzy or lightheaded, or you feel like you may faint. Your heart rate becomes irregular. You can feel your heart flutter in your chest or skip heartbeats. Tell your doctor if these symptoms are new or worse. Watch closely for changes in your health, and be sure to contact your doctor if you have any problems. Where can you learn more? Go to http://www.woods.com/ and enter U020 to learn more about \"Atrial Fibrillation: Care Instructions. \"  Current as of: September 7, 2022               Content Version: 13.5  © 2006-2022 [employer]wise[employer]. Care instructions adapted under license by [facility] ([facility]). If you have questions about a medical condition or this instruction, always ask your healthcare professional. [place] [address] any warranty or liability for your use of this information. Please visit www.cardiosmart. org for more information regarding cardiovascular disease prevention and treatment.

## 2023-02-01 PROBLEM — T82.857A STENOSIS OF PROSTHETIC AORTIC VALVE: Status: ACTIVE | Noted: 2023-02-01

## 2023-02-03 ENCOUNTER — OFFICE VISIT (OUTPATIENT)
Dept: CARDIOLOGY CLINIC | Age: 83
End: 2023-02-03

## 2023-02-03 VITALS
DIASTOLIC BLOOD PRESSURE: 82 MMHG | SYSTOLIC BLOOD PRESSURE: 134 MMHG | BODY MASS INDEX: 28.95 KG/M2 | HEIGHT: 63 IN | WEIGHT: 163.4 LBS | HEART RATE: 68 BPM

## 2023-02-03 DIAGNOSIS — T82.857D STENOSIS OF PROSTHETIC AORTIC VALVE, SUBSEQUENT ENCOUNTER: ICD-10-CM

## 2023-02-03 DIAGNOSIS — I48.19 PERSISTENT ATRIAL FIBRILLATION (HCC): ICD-10-CM

## 2023-02-03 DIAGNOSIS — J44.9 COPD, SEVERE (HCC): ICD-10-CM

## 2023-02-03 DIAGNOSIS — Z95.2 STATUS POST AORTIC VALVE REPLACEMENT: Primary | ICD-10-CM

## 2023-02-03 DIAGNOSIS — I10 ESSENTIAL HYPERTENSION: ICD-10-CM

## 2023-02-03 ASSESSMENT — ENCOUNTER SYMPTOMS: SHORTNESS OF BREATH: 1

## 2023-06-07 ENCOUNTER — TELEPHONE (OUTPATIENT)
Age: 83
End: 2023-06-07

## 2023-06-07 NOTE — TELEPHONE ENCOUNTER
Liliana Perez is asking for samples of Eliquis for pt  Please call pt [phone] to let her know if we have samples for pt  Any questions feel free to  call [phone]

## 2023-06-08 NOTE — TELEPHONE ENCOUNTER
Patient notified that samples are available for  at [facility] office, but will not be available for pick-up after two weeks.

## 2023-07-07 NOTE — TELEPHONE ENCOUNTER
MEDICATION REFILL REQUEST      Name of Medication:  Eliquis   Dose:  5 mg  Frequency:  twice a day   Quantity:    Days' supply:  90      Pharmacy Name/Location:  [facility]

## 2023-07-25 ENCOUNTER — PATIENT MESSAGE (OUTPATIENT)
Age: 83
End: 2023-07-25

## 2023-07-25 ENCOUNTER — TELEPHONE (OUTPATIENT)
Age: 83
End: 2023-07-25

## 2023-07-25 NOTE — TELEPHONE ENCOUNTER
[employer] called on behalf of the pt to see if we received the patient assistance information for the pt's Eliquis. States the pt is running out of this Rx and needs assistance asap.

## 2023-07-27 NOTE — TELEPHONE ENCOUNTER
From: TRELL DOS SANTOS  To: Emily Stout  Sent: 7/25/2023 2:20 PM EDT  Subject: Eliquis    Good afternoon,     As of this message, we have not received any patient assistance documents from [employer]. They can refax them to [phone]. You can also call [phone] and apply directly through the  of Eliquis.

## 2023-08-15 ENCOUNTER — OFFICE VISIT (OUTPATIENT)
Age: 83
End: 2023-08-15
Payer: COMMERCIAL

## 2023-08-15 VITALS
BODY MASS INDEX: 28.7 KG/M2 | HEIGHT: 63 IN | WEIGHT: 162 LBS | HEART RATE: 72 BPM | SYSTOLIC BLOOD PRESSURE: 102 MMHG | DIASTOLIC BLOOD PRESSURE: 70 MMHG

## 2023-08-15 DIAGNOSIS — T82.857D STENOSIS OF PROSTHETIC AORTIC VALVE, SUBSEQUENT ENCOUNTER: Primary | ICD-10-CM

## 2023-08-15 DIAGNOSIS — Z95.2 STATUS POST AORTIC VALVE REPLACEMENT: ICD-10-CM

## 2023-08-15 DIAGNOSIS — I27.29 OTHER SECONDARY PULMONARY HYPERTENSION (HCC): ICD-10-CM

## 2023-08-15 DIAGNOSIS — J44.9 COPD, SEVERE (HCC): ICD-10-CM

## 2023-08-15 DIAGNOSIS — I10 ESSENTIAL HYPERTENSION: ICD-10-CM

## 2023-08-15 PROCEDURE — 3078F DIAST BP <80 MM HG: CPT | Performed by: INTERNAL MEDICINE

## 2023-08-15 PROCEDURE — 99214 OFFICE O/P EST MOD 30 MIN: CPT | Performed by: INTERNAL MEDICINE

## 2023-08-15 PROCEDURE — 3074F SYST BP LT 130 MM HG: CPT | Performed by: INTERNAL MEDICINE

## 2023-08-15 PROCEDURE — 1123F ACP DISCUSS/DSCN MKR DOCD: CPT | Performed by: INTERNAL MEDICINE

## 2023-08-15 RX ORDER — ENALAPRIL MALEATE 2.5 MG/1
2.5 TABLET ORAL DAILY
COMMUNITY

## 2023-08-15 RX ORDER — ALENDRONATE SODIUM 70 MG/1
70 TABLET ORAL
COMMUNITY

## 2023-08-15 ASSESSMENT — ENCOUNTER SYMPTOMS: SHORTNESS OF BREATH: 1

## 2023-08-16 ENCOUNTER — TELEPHONE (OUTPATIENT)
Dept: CASE MANAGEMENT | Age: 83
End: 2023-08-16

## 2023-08-16 DIAGNOSIS — R06.02 SOB (SHORTNESS OF BREATH): ICD-10-CM

## 2023-08-16 DIAGNOSIS — T82.857A STENOSIS OF PROSTHETIC AORTIC VALVE, INITIAL ENCOUNTER: Primary | ICD-10-CM

## 2023-08-16 NOTE — TELEPHONE ENCOUNTER
Spoke with pt regarding plans for upcoming appts scheduled for 8/24 with arrival time @09:45 am. Instructed pt to enter the hospital on the outpatient side ([address] go to 2nd floor, and check in at radiology and register for both tests. Nothing to eat or drink after 8:00 am except sips of water with medications. Hold lasix on 8/24. TAVR protocol CT: 10:15 am  Pulmonary Function Test: 11:00 am on 3rd floor [address]    Pt verbalized understanding and contact information ([phone]) provided for any further questions.     Westley Parker, Structural Heart Navigator

## 2023-08-24 ENCOUNTER — HOSPITAL ENCOUNTER (OUTPATIENT)
Dept: PULMONOLOGY | Age: 83
End: 2023-08-24
Attending: INTERNAL MEDICINE
Payer: COMMERCIAL

## 2023-08-24 ENCOUNTER — HOSPITAL ENCOUNTER (OUTPATIENT)
Dept: CT IMAGING | Age: 83
Discharge: HOME | End: 2023-08-24
Attending: INTERNAL MEDICINE
Payer: COMMERCIAL

## 2023-08-24 DIAGNOSIS — R06.02 SOB (SHORTNESS OF BREATH): ICD-10-CM

## 2023-08-24 DIAGNOSIS — T82.857A STENOSIS OF PROSTHETIC AORTIC VALVE, INITIAL ENCOUNTER: ICD-10-CM

## 2023-08-24 LAB — CREAT BLD-MCNC: 1.02 MG/DL (ref 0.8–1.5)

## 2023-08-24 PROCEDURE — 82565 ASSAY OF CREATININE: CPT

## 2023-08-24 PROCEDURE — 71275 CT ANGIOGRAPHY CHEST: CPT

## 2023-08-24 PROCEDURE — 94729 DIFFUSING CAPACITY: CPT

## 2023-08-24 PROCEDURE — 94010 BREATHING CAPACITY TEST: CPT

## 2023-08-24 PROCEDURE — 94726 PLETHYSMOGRAPHY LUNG VOLUMES: CPT

## 2023-08-24 PROCEDURE — 75574 CT ANGIO HRT W/3D IMAGE: CPT

## 2023-08-24 PROCEDURE — 75574 CT ANGIO HRT W/3D IMAGE: CPT | Performed by: INTERNAL MEDICINE

## 2023-08-24 PROCEDURE — 70498 CT ANGIOGRAPHY NECK: CPT

## 2023-08-24 PROCEDURE — 6360000004 HC RX CONTRAST MEDICATION: Performed by: INTERNAL MEDICINE

## 2023-08-24 RX ADMIN — IOPAMIDOL 75 ML: 755 INJECTION, SOLUTION INTRAVENOUS at 10:35

## 2023-08-24 ASSESSMENT — KANSAS CITY CARDIOMYOPATHY QUESTIONNAIRE (KCCQ12)
1C. OVER THE PAST 2 WEEKS, HOW MUCH WERE YOU LIMITED BY HEART FAILURE SYMPTOMS (SHORTNESS OF BREATH OR FATIGUE) WHEN HURRYING OR JOGGING (AS IF TO CATCH A BUS): 1
1A. OVER THE PAST 2 WEEKS, HOW MUCH WERE YOU LIMITED BY HEART FAILURE SYMPTOMS (SHORTNESS OF BREATH OR FATIGUE) WHEN SHOWERING OR BATHING: 3
8B. OVER THE PAST 2 WEEKS, ON AVERAGE, HOW HAS HEART FAILURE LIMITED YOU ABILITY TO WORK OR DO HOUSEHOLD CHORES: 2
5. OVER THE PAST 2 WEEKS, ON AVERAGE, HOW MANY TIMES HAVE YOU BEEN FORCED TO SLEEP SITTING UP IN A CHAIR OR WITH AT LEAST 3 PILLOWS TO PROP YOU UP BECAUSE OF SHORTNESS OF BREATH?: 5
2. OVER THE PAST 2 WEEKS, HOW MANY TIMES DID YOU HAVE SWELLING IN YOUR FEET, ANKLES OR LEGS WHEN YOU WOKE UP IN THE MORNING: 5
6. OVER THE PAST 2 WEEKS, HOW MUCH HAS YOUR HEART FAILURE LIMITED YOUR ENJOYMENT OF LIFE: 2
8A. OVER THE PAST 2 WEEKS, ON AVERAGE, HOW HAS HEART FAILURE LIMITED YOU ABILITY TO DO HOBBIES OR RECREATIONAL ACTIVITIES: 2
8C. OVER THE PAST 2 WEEKS, ON AVERAGE, HOW HAS HEART FAILURE LIMITED YOU ABILITY TO VISIT FAMILY AND FRIENDS OUR OF YOUR HOME: 2
3. OVER THE PAST 2 WEEKS, ON AVERAGE, HOW MANY TIMES HAS FATIGUE LIMITED YOUR ABILITY TO DO WHAT YOU WANTED: 2
1B. OVER THE PAST 2 WEEKS, HOW MUCH WERE YOU LIMITED BY HEART FAILURE SYMPTOMS (SHORTNESS OF BREATH OR FATIGUE) WHEN WALKING 1 BLOCK ON LEVEL GROUND: 2
7. IF YOU HAD TO SPEND THE REST OF YOUR LIFE WITH YOUR HEART FAILURE THE WAY IT IS RIGHT NOW, HOW WOULD YOU FEEL ABOUT THIS?: 2
4. OVER THE PAST 2 WEEKS, ON AVERAGE, HOW MANY TIMES HAS SHORTNESS OF BREATH LIMITED YOUR ABILITY TO DO WHAT YOU WANTED: 2

## 2023-08-24 NOTE — PROGRESS NOTES
Educational information/pamphlet provided to the pt regarding aortic stenosis and treatment options (SAVR and TAVR). Provided pt the CardioSmart tool for review as the shared decision making process continues between pt and valve team physicians. Also explained that the CT will evaluate the pt for a potential TAVR/SAVR. Contact information provided for any further questions.     Shaun Spain, Structural Heart Navigator

## 2023-09-06 ENCOUNTER — OFFICE VISIT (OUTPATIENT)
Age: 83
End: 2023-09-06
Payer: COMMERCIAL

## 2023-09-06 VITALS
BODY MASS INDEX: 28.7 KG/M2 | DIASTOLIC BLOOD PRESSURE: 70 MMHG | HEART RATE: 64 BPM | HEIGHT: 63 IN | WEIGHT: 162 LBS | SYSTOLIC BLOOD PRESSURE: 132 MMHG

## 2023-09-06 DIAGNOSIS — T82.857S STENOSIS OF PROSTHETIC AORTIC VALVE, SEQUELA: Primary | ICD-10-CM

## 2023-09-06 DIAGNOSIS — Z95.2 STATUS POST AORTIC VALVE REPLACEMENT: ICD-10-CM

## 2023-09-06 PROCEDURE — 3075F SYST BP GE 130 - 139MM HG: CPT | Performed by: INTERNAL MEDICINE

## 2023-09-06 PROCEDURE — 99215 OFFICE O/P EST HI 40 MIN: CPT | Performed by: INTERNAL MEDICINE

## 2023-09-06 PROCEDURE — 3078F DIAST BP <80 MM HG: CPT | Performed by: INTERNAL MEDICINE

## 2023-09-06 PROCEDURE — 1123F ACP DISCUSS/DSCN MKR DOCD: CPT | Performed by: INTERNAL MEDICINE

## 2023-09-06 ASSESSMENT — ENCOUNTER SYMPTOMS
DIARRHEA: 0
COLOR CHANGE: 0
SHORTNESS OF BREATH: 0
LEFT EYE: 0

## 2023-09-06 NOTE — PROGRESS NOTES
[address], [address] [place] [address]  PHONE: [phone]    Noa Dawkins  1940      SUBJECTIVE:   Noa Dawkins is a 79 y.o. female seen for a follow up visit regarding the following:     Chief Complaint   Patient presents with    Consultation    Hypertension       HPI:    Patient is a 69-year-old female who had a bioprosthetic valve placed in 2015. Appears this was a 23 mm valve. Review of echo was dating back to 2017 shows elevated gradients. Her gradient in 2015 showed a mean gradient 23 with peak gradient 46. In 2019 mean gradient was 25 with peak gradient 43. Her gradients have increased with her most recent echocardiogram in August showing gradients. This is outlined below:      Left Ventricle: Normal left ventricular systolic function. EF by 2D Simpsons Biplane is 63%. Left ventricle size is normal. Mildly increased wall thickness. Findings consistent with mild concentric hypertrophy. Normal wall motion. Abnormal diastolic function. Average E/e' ratio is 21.94. Aortic Valve: Severe stenosis of the aortic valve. Noted by the apical view. AV mean gradient is 40 mmHg. AV peak gradient is 60 mmHg. AV peak velocity is 3.9 m/s. AV AT is 99.00 ms. LVOT:AV VTI Index is 0.36. LVOT diameter is 2.0 cm. AV area by continuity VTI is 1.1 cm2. AV Stroke Volume index is 61.6 mL/m2. Mitral Valve: Mild regurgitation with a centrally directed jet. Tricuspid Valve: Mild regurgitation with a centrally directed jet. The estimated RVSP is 51 mmHg. Moderately elevated RVSP. Left Atrium: Left atrium is moderately dilated. She denies any active symptoms but is fairly inactive. Denies any recent exertional dyspnea or chest pain. No syncope. Past Medical History, Past Surgical History, Family history, Social History, and Medications were all reviewed with the patient today and updated as necessary.            Current Outpatient Medications:

## 2023-09-15 NOTE — PROGRESS NOTES
Patient pre-assessment complete for MARIE scheduled for 23, arrival time 1100. Patient verified using . Patient instructed to bring a list of all home medications on the day of procedure. NPO status reinforced. Patient instructed to HOLD lasix. Instructed they can take all other medications excluding vitamins & supplements. Patient verbalizes understanding of all instructions & denies any questions at this time.

## 2023-09-18 ENCOUNTER — HOSPITAL ENCOUNTER (OUTPATIENT)
Dept: CARDIAC CATH/INVASIVE PROCEDURES | Age: 83
Discharge: HOME | End: 2023-09-18
Attending: INTERNAL MEDICINE | Admitting: INTERNAL MEDICINE
Payer: MEDICARE

## 2023-09-18 VITALS
HEART RATE: 55 BPM | OXYGEN SATURATION: 90 % | TEMPERATURE: 98.2 F | WEIGHT: 162 LBS | HEIGHT: 63 IN | RESPIRATION RATE: 12 BRPM | SYSTOLIC BLOOD PRESSURE: 151 MMHG | DIASTOLIC BLOOD PRESSURE: 56 MMHG | BODY MASS INDEX: 28.7 KG/M2

## 2023-09-18 DIAGNOSIS — T82.857S STENOSIS OF PROSTHETIC AORTIC VALVE, SEQUELA: ICD-10-CM

## 2023-09-18 DIAGNOSIS — Z95.2 STATUS POST AORTIC VALVE REPLACEMENT: ICD-10-CM

## 2023-09-18 LAB
ANION GAP SERPL CALC-SCNC: 2 MMOL/L (ref 2–11)
BUN SERPL-MCNC: 26 MG/DL (ref 8–23)
CALCIUM SERPL-MCNC: 9 MG/DL (ref 8.3–10.4)
CHLORIDE SERPL-SCNC: 112 MMOL/L (ref 101–110)
CO2 SERPL-SCNC: 29 MMOL/L (ref 21–32)
CREAT SERPL-MCNC: 0.9 MG/DL (ref 0.6–1)
ECHO BSA: 1.81 M2
EKG ATRIAL RATE: 58 BPM
EKG DIAGNOSIS: NORMAL
EKG P AXIS: 56 DEGREES
EKG P-R INTERVAL: 192 MS
EKG Q-T INTERVAL: 446 MS
EKG QRS DURATION: 90 MS
EKG QTC CALCULATION (BAZETT): 437 MS
EKG R AXIS: 31 DEGREES
EKG T AXIS: 66 DEGREES
EKG VENTRICULAR RATE: 58 BPM
ERYTHROCYTE [DISTWIDTH] IN BLOOD BY AUTOMATED COUNT: 13.7 % (ref 11.9–14.6)
GLUCOSE SERPL-MCNC: 88 MG/DL (ref 65–100)
HCT VFR BLD AUTO: 37.4 % (ref 35.8–46.3)
HGB BLD-MCNC: 11.6 G/DL (ref 11.7–15.4)
MAGNESIUM SERPL-MCNC: 2 MG/DL (ref 1.8–2.4)
MCH RBC QN AUTO: 29.5 PG (ref 26.1–32.9)
MCHC RBC AUTO-ENTMCNC: 31 G/DL (ref 31.4–35)
MCV RBC AUTO: 95.2 FL (ref 82–102)
NRBC # BLD: 0 K/UL (ref 0–0.2)
PLATELET # BLD AUTO: 141 K/UL (ref 150–450)
PMV BLD AUTO: 9.8 FL (ref 9.4–12.3)
POTASSIUM SERPL-SCNC: 4.1 MMOL/L (ref 3.5–5.1)
RBC # BLD AUTO: 3.93 M/UL (ref 4.05–5.2)
SODIUM SERPL-SCNC: 143 MMOL/L (ref 133–143)
WBC # BLD AUTO: 5.6 K/UL (ref 4.3–11.1)

## 2023-09-18 PROCEDURE — 93325 DOPPLER ECHO COLOR FLOW MAPG: CPT

## 2023-09-18 PROCEDURE — 6360000002 HC RX W HCPCS: Performed by: INTERNAL MEDICINE

## 2023-09-18 PROCEDURE — 93005 ELECTROCARDIOGRAM TRACING: CPT | Performed by: INTERNAL MEDICINE

## 2023-09-18 PROCEDURE — 93010 ELECTROCARDIOGRAM REPORT: CPT | Performed by: INTERNAL MEDICINE

## 2023-09-18 PROCEDURE — 99152 MOD SED SAME PHYS/QHP 5/>YRS: CPT | Performed by: INTERNAL MEDICINE

## 2023-09-18 PROCEDURE — 6370000000 HC RX 637 (ALT 250 FOR IP): Performed by: INTERNAL MEDICINE

## 2023-09-18 PROCEDURE — 93325 DOPPLER ECHO COLOR FLOW MAPG: CPT | Performed by: INTERNAL MEDICINE

## 2023-09-18 PROCEDURE — 93312 ECHO TRANSESOPHAGEAL: CPT | Performed by: INTERNAL MEDICINE

## 2023-09-18 PROCEDURE — 83735 ASSAY OF MAGNESIUM: CPT

## 2023-09-18 PROCEDURE — 80048 BASIC METABOLIC PNL TOTAL CA: CPT

## 2023-09-18 PROCEDURE — 85027 COMPLETE CBC AUTOMATED: CPT

## 2023-09-18 PROCEDURE — 93320 DOPPLER ECHO COMPLETE: CPT | Performed by: INTERNAL MEDICINE

## 2023-09-18 RX ORDER — FENTANYL CITRATE 50 UG/ML
INJECTION, SOLUTION INTRAMUSCULAR; INTRAVENOUS PRN
Status: COMPLETED | OUTPATIENT
Start: 2023-09-18 | End: 2023-09-18

## 2023-09-18 RX ORDER — LIDOCAINE HYDROCHLORIDE 20 MG/ML
SOLUTION OROPHARYNGEAL PRN
Status: COMPLETED | OUTPATIENT
Start: 2023-09-18 | End: 2023-09-18

## 2023-09-18 RX ORDER — MIDAZOLAM HYDROCHLORIDE 1 MG/ML
INJECTION INTRAMUSCULAR; INTRAVENOUS PRN
Status: COMPLETED | OUTPATIENT
Start: 2023-09-18 | End: 2023-09-18

## 2023-09-18 RX ADMIN — MIDAZOLAM 2 MG: 1 INJECTION INTRAMUSCULAR; INTRAVENOUS at 14:51

## 2023-09-18 RX ADMIN — FENTANYL CITRATE 50 MCG: 50 INJECTION, SOLUTION INTRAMUSCULAR; INTRAVENOUS at 14:51

## 2023-09-18 RX ADMIN — LIDOCAINE HYDROCHLORIDE 15 ML: 20 SOLUTION ORAL at 14:35

## 2023-09-18 RX ADMIN — MIDAZOLAM 2 MG: 1 INJECTION INTRAMUSCULAR; INTRAVENOUS at 14:53

## 2023-09-18 NOTE — PROGRESS NOTES
Patient received to [facility] room # 14. Ambulatory from sari. Patient scheduled for MARIE today with Dr Osmin oT. Procedure reviewed & questions answered, voiced good understanding consent obtained & placed on chart. All medications and medical history reviewed. Will prep patient per orders. Patient & family updated on plan of care. The patient has a fraility score of 3-MANAGING WELL, based on ability to perform ADLs.

## 2023-09-18 NOTE — PROGRESS NOTES
MARIE complete with Dr. Ashlee Dixon. Patient numbed at 1435. Sedation START: 1450  Sedation STOP: 1501    Patient received 4mg Versed, 50 mcg Fentanyl. Tolerated procedure well. Vital signs stable at this time. Family to bedside.

## 2023-09-18 NOTE — PROGRESS NOTES
Discharge instructions given per orders, voiced good understanding of throat care, medications & follow up care.  Denies any questions

## 2023-10-03 NOTE — PROGRESS NOTES
Patient pre-assessment complete for Ludy Reyes scheduled for [facility], arrival time 0730. Patient verified using . Patient instructed to bring a list of all home medications on the day of procedure. NPO status reinforced. Patient informed to take a full dose aspirin 325mg  or 81 mg x 4 on the day of procedure. Patient instructed to HOLD Eliquis and Lasix. Instructed they can take all other medications excluding vitamins & supplements. Patient verbalizes understanding of all instructions & denies any questions at this time.

## 2023-10-04 ENCOUNTER — HOSPITAL ENCOUNTER (OUTPATIENT)
Age: 83
Setting detail: OUTPATIENT SURGERY
Discharge: HOME | End: 2023-10-04
Attending: INTERNAL MEDICINE | Admitting: INTERNAL MEDICINE
Payer: MEDICARE

## 2023-10-04 VITALS
DIASTOLIC BLOOD PRESSURE: 72 MMHG | BODY MASS INDEX: 28.7 KG/M2 | HEIGHT: 63 IN | SYSTOLIC BLOOD PRESSURE: 137 MMHG | WEIGHT: 162 LBS | RESPIRATION RATE: 18 BRPM | HEART RATE: 86 BPM | OXYGEN SATURATION: 99 % | TEMPERATURE: 98 F

## 2023-10-04 DIAGNOSIS — I35.0 AORTIC VALVE STENOSIS, ETIOLOGY OF CARDIAC VALVE DISEASE UNSPECIFIED: ICD-10-CM

## 2023-10-04 LAB
ANION GAP SERPL CALC-SCNC: 5 MMOL/L (ref 2–11)
BUN SERPL-MCNC: 31 MG/DL (ref 8–23)
CALCIUM SERPL-MCNC: 8.6 MG/DL (ref 8.3–10.4)
CHLORIDE SERPL-SCNC: 112 MMOL/L (ref 101–110)
CO2 SERPL-SCNC: 30 MMOL/L (ref 21–32)
CREAT SERPL-MCNC: 0.9 MG/DL (ref 0.6–1)
ECHO BSA: 1.81 M2
EKG ATRIAL RATE: 60 BPM
EKG DIAGNOSIS: NORMAL
EKG P AXIS: 65 DEGREES
EKG P-R INTERVAL: 232 MS
EKG Q-T INTERVAL: 436 MS
EKG QRS DURATION: 94 MS
EKG QTC CALCULATION (BAZETT): 436 MS
EKG R AXIS: -8 DEGREES
EKG T AXIS: 35 DEGREES
EKG VENTRICULAR RATE: 60 BPM
ERYTHROCYTE [DISTWIDTH] IN BLOOD BY AUTOMATED COUNT: 13.8 % (ref 11.9–14.6)
GLUCOSE SERPL-MCNC: 94 MG/DL (ref 65–100)
HCT VFR BLD AUTO: 35.1 % (ref 35.8–46.3)
HGB BLD-MCNC: 11.2 G/DL (ref 11.7–15.4)
MAGNESIUM SERPL-MCNC: 2.1 MG/DL (ref 1.8–2.4)
MCH RBC QN AUTO: 29.6 PG (ref 26.1–32.9)
MCHC RBC AUTO-ENTMCNC: 31.9 G/DL (ref 31.4–35)
MCV RBC AUTO: 92.6 FL (ref 82–102)
NRBC # BLD: 0 K/UL (ref 0–0.2)
PLATELET # BLD AUTO: 132 K/UL (ref 150–450)
PMV BLD AUTO: 9.6 FL (ref 9.4–12.3)
POTASSIUM SERPL-SCNC: 4.1 MMOL/L (ref 3.5–5.1)
RBC # BLD AUTO: 3.79 M/UL (ref 4.05–5.2)
SODIUM SERPL-SCNC: 147 MMOL/L (ref 133–143)
WBC # BLD AUTO: 5.4 K/UL (ref 4.3–11.1)

## 2023-10-04 PROCEDURE — 93454 CORONARY ARTERY ANGIO S&I: CPT | Performed by: INTERNAL MEDICINE

## 2023-10-04 PROCEDURE — 85027 COMPLETE CBC AUTOMATED: CPT

## 2023-10-04 PROCEDURE — 2500000003 HC RX 250 WO HCPCS: Performed by: INTERNAL MEDICINE

## 2023-10-04 PROCEDURE — 93005 ELECTROCARDIOGRAM TRACING: CPT | Performed by: INTERNAL MEDICINE

## 2023-10-04 PROCEDURE — 6360000002 HC RX W HCPCS: Performed by: INTERNAL MEDICINE

## 2023-10-04 PROCEDURE — 93010 ELECTROCARDIOGRAM REPORT: CPT | Performed by: INTERNAL MEDICINE

## 2023-10-04 PROCEDURE — C1769 GUIDE WIRE: HCPCS | Performed by: INTERNAL MEDICINE

## 2023-10-04 PROCEDURE — 80048 BASIC METABOLIC PNL TOTAL CA: CPT

## 2023-10-04 PROCEDURE — 2580000003 HC RX 258: Performed by: INTERNAL MEDICINE

## 2023-10-04 PROCEDURE — 6360000004 HC RX CONTRAST MEDICATION: Performed by: INTERNAL MEDICINE

## 2023-10-04 PROCEDURE — 83735 ASSAY OF MAGNESIUM: CPT

## 2023-10-04 PROCEDURE — C1894 INTRO/SHEATH, NON-LASER: HCPCS | Performed by: INTERNAL MEDICINE

## 2023-10-04 PROCEDURE — 99152 MOD SED SAME PHYS/QHP 5/>YRS: CPT | Performed by: INTERNAL MEDICINE

## 2023-10-04 PROCEDURE — 2709999900 HC NON-CHARGEABLE SUPPLY: Performed by: INTERNAL MEDICINE

## 2023-10-04 RX ORDER — LIDOCAINE HYDROCHLORIDE 10 MG/ML
INJECTION, SOLUTION INFILTRATION; PERINEURAL PRN
Status: DISCONTINUED | OUTPATIENT
Start: 2023-10-04 | End: 2023-10-04 | Stop reason: HOSPADM

## 2023-10-04 RX ORDER — SODIUM CHLORIDE 9 MG/ML
INJECTION, SOLUTION INTRAVENOUS CONTINUOUS
Status: DISCONTINUED | OUTPATIENT
Start: 2023-10-04 | End: 2023-10-04 | Stop reason: HOSPADM

## 2023-10-04 RX ORDER — FLUTICASONE FUROATE AND VILANTEROL 100; 25 UG/1; UG/1
1 POWDER RESPIRATORY (INHALATION) DAILY
COMMUNITY

## 2023-10-04 RX ORDER — HEPARIN SODIUM 200 [USP'U]/100ML
INJECTION, SOLUTION INTRAVENOUS CONTINUOUS PRN
Status: DISCONTINUED | OUTPATIENT
Start: 2023-10-04 | End: 2023-10-04 | Stop reason: HOSPADM

## 2023-10-04 RX ORDER — MIDAZOLAM HYDROCHLORIDE 1 MG/ML
INJECTION INTRAMUSCULAR; INTRAVENOUS PRN
Status: DISCONTINUED | OUTPATIENT
Start: 2023-10-04 | End: 2023-10-04 | Stop reason: HOSPADM

## 2023-10-04 RX ORDER — ASPIRIN 81 MG/1
324 TABLET, CHEWABLE ORAL ONCE
Status: DISCONTINUED | OUTPATIENT
Start: 2023-10-04 | End: 2023-10-04 | Stop reason: HOSPADM

## 2023-10-04 RX ADMIN — SODIUM CHLORIDE: 9 INJECTION, SOLUTION INTRAVENOUS at 08:50

## 2023-10-04 ASSESSMENT — ENCOUNTER SYMPTOMS
COLOR CHANGE: 0
NAUSEA: 0
HEMOPTYSIS: 0
COUGH: 0
WHEEZING: 0
HEMATEMESIS: 0
CONSTIPATION: 0
BLURRED VISION: 0
LEFT EYE: 0
SPUTUM PRODUCTION: 0
ORTHOPNEA: 0
SHORTNESS OF BREATH: 0
BACK PAIN: 0
RIGHT EYE: 0
SORE THROAT: 0
DIARRHEA: 0
ABDOMINAL PAIN: 0
HEARTBURN: 0
VOMITING: 0

## 2023-10-04 NOTE — PROGRESS NOTES
TRANSFER - OUT REPORT:    Verbal report given to RN on Ascencion Morris  being transferred to [facility] for routine progression of patient care       Report consisted of patient's Situation, Background, Assessment and   Recommendations(SBAR). Information from the following report(s) Nurse Handoff Report and MAR was reviewed with the receiving nurse.       [facility] w/ Dr. Mallory Dolan  No interventions, TAVR workup  R radial access  TR band to R radial @12 mL  No s/sxs of bleeding or hematoma to R radial access site    Heparin 5,000 units IC  Versed 2mg IV  Fentanyl 25mcg IV

## 2023-10-04 NOTE — PROGRESS NOTES
Patient received to [facility] room # 15  Ambulatory from [facility]. Patient scheduled for [facility] today with Dr Carly Julian. Procedure reviewed & questions answered, voiced good understanding consent obtained & placed on chart. All medications and medical history reviewed. Will prep patient per orders. Patient & family updated on plan of care. The patient has a fraility score of 3-MANAGING WELL, based on patient A&Ox3, patient able to ambulate to room without difficulty.     Patient took aspirin 324mg at 0600 piror to arrival.

## 2023-10-04 NOTE — DISCHARGE INSTRUCTIONS
HEART CATHETERIZATION/ANGIOGRAPHY DISCHARGE INSTRUCTIONS    Check puncture site frequently for swelling or bleeding. If there is any bleeding, apply pressure over the area with a clean towel or washcloth and call 911. Notify your doctor for any redness, swelling, drainage, or oozing from the puncture site. Notify your doctor for any fever or chills. If the extremity becomes cold, numb, or painful call [facility] [phone]. Activity should be limited for the next 48 hours. No heavy lifting, pushing, pulling  or strenuous activity for 48 hours. No heavy lifting (anything over 10 pounds) for 3 days. You may resume your usual diet. Drink more fluids than usual.  Have a responsible person drive you home and stay with you for at least 24 hours after your heart catheterization/angiography. You may remove bandage from your right radial in 24 hours. You may shower in 24 hours. No tub baths, hot tubs, or swimming for 1 week. Do not place any lotions, creams, powders, or ointments over puncture site for 1 week. You may place a clean band-aid over the puncture site each day for 5 days. Change daily. Sedation for a Medical Procedure: Care Instructions     You were given a sedative medication during your visit. While many of the effects will have worn   off before you leave; you may continue to feel some effects for several hours. Common side effects from sedation include:  Feeling sleepy. (Your doctors and nurses will make sure you are not too sleepy to go home.)  Nausea and vomiting. This usually does not last long. Feeling tired. How can you care for yourself at home? Activity    Don't do anything for 24 hours that requires attention to detail. It takes time for the medicine effects to completely wear off. Do not make important legal decisions for 24 hours. Do not sign any legal documents for 24 hours.      Do not drink alcohol today     For your safety, you should not drive or

## 2023-10-04 NOTE — CONSULTS
THYROIDECTOMY  1970    partial, 2/2 goiter    UROLOGICAL SURGERY      kidney stone       Family History   Problem Relation Age of Onset    Heart Disease Mother     Heart Disease Father     Heart Disease Sister        Social History     Socioeconomic History    Marital status: Single     Spouse name: Not on file    Number of children: Not on file    Years of education: Not on file    Highest education level: Not on file   Occupational History    Not on file   Tobacco Use    Smoking status: Former     Packs/day: 1     Types: Cigarettes     Quit date: 1990     Years since quittin.1    Smokeless tobacco: Never   Substance and Sexual Activity    Alcohol use: No    Drug use: No    Sexual activity: Not on file   Other Topics Concern    Not on file   Social History Narrative    Pt lives alone - completely independent  Has 2 daughters relatively close by     Social Determinants of Health     Financial Resource Strain: Not on file   Food Insecurity: Not on file   Transportation Needs: Not on file   Physical Activity: Not on file   Stress: Not on file   Social Connections: Not on file   Intimate Partner Violence: Not on file   Housing Stability: Not on file       Allergies   Allergen Reactions    Sulfa Antibiotics Rash       No current facility-administered medications on file prior to encounter.      Current Outpatient Medications on File Prior to Encounter   Medication Sig Dispense Refill    fluticasone furoate-vilanterol (BREO ELLIPTA) 100-25 MCG/ACT inhaler Inhale 1 puff into the lungs daily      alendronate (FOSAMAX) 70 MG tablet Take 1 tablet by mouth every 7 days      apixaban (ELIQUIS) 5 MG TABS tablet Take 1 tablet by mouth 2 times daily 180 tablet 3    carvedilol (COREG) 6.25 MG tablet Take 1 tablet by mouth 2 times daily 180 tablet 3    Fexofenadine HCl (ALLEGRA PO) Take by mouth      losartan (COZAAR) 25 MG tablet Take 1 tablet by mouth daily TAKE ONE TABLET BY MOUTH ONE TIME DAILY 90 tablet 3

## 2023-10-12 NOTE — TELEPHONE ENCOUNTER
Requested Prescriptions     Signed Prescriptions Disp Refills    apixaban (ELIQUIS) 5 MG TABS tablet 180 tablet 3     Sig: Take 1 tablet by mouth 2 times daily     Authorizing Provider: Parmjit HARRIS     Ordering User: Alexandra Valenzuela to [facility]

## 2023-10-17 ENCOUNTER — TELEPHONE (OUTPATIENT)
Dept: CASE MANAGEMENT | Age: 83
End: 2023-10-17

## 2023-10-17 ASSESSMENT — ENCOUNTER SYMPTOMS: SHORTNESS OF BREATH: 1

## 2023-10-18 ENCOUNTER — TELEPHONE (OUTPATIENT)
Age: 83
End: 2023-10-18

## 2023-10-18 ENCOUNTER — OFFICE VISIT (OUTPATIENT)
Age: 83
End: 2023-10-18
Payer: MEDICARE

## 2023-10-18 ENCOUNTER — PATIENT MESSAGE (OUTPATIENT)
Age: 83
End: 2023-10-18

## 2023-10-18 VITALS
BODY MASS INDEX: 28.17 KG/M2 | HEART RATE: 58 BPM | WEIGHT: 159 LBS | HEIGHT: 63 IN | SYSTOLIC BLOOD PRESSURE: 178 MMHG | DIASTOLIC BLOOD PRESSURE: 84 MMHG

## 2023-10-18 DIAGNOSIS — I10 ESSENTIAL HYPERTENSION: ICD-10-CM

## 2023-10-18 DIAGNOSIS — T82.857D STENOSIS OF PROSTHETIC AORTIC VALVE, SUBSEQUENT ENCOUNTER: Primary | ICD-10-CM

## 2023-10-18 DIAGNOSIS — J44.9 COPD, SEVERE (HCC): ICD-10-CM

## 2023-10-18 PROCEDURE — 1090F PRES/ABSN URINE INCON ASSESS: CPT | Performed by: INTERNAL MEDICINE

## 2023-10-18 PROCEDURE — G8417 CALC BMI ABV UP PARAM F/U: HCPCS | Performed by: INTERNAL MEDICINE

## 2023-10-18 PROCEDURE — 1036F TOBACCO NON-USER: CPT | Performed by: INTERNAL MEDICINE

## 2023-10-18 PROCEDURE — 3023F SPIROM DOC REV: CPT | Performed by: INTERNAL MEDICINE

## 2023-10-18 PROCEDURE — G8428 CUR MEDS NOT DOCUMENT: HCPCS | Performed by: INTERNAL MEDICINE

## 2023-10-18 PROCEDURE — 3077F SYST BP >= 140 MM HG: CPT | Performed by: INTERNAL MEDICINE

## 2023-10-18 PROCEDURE — 1123F ACP DISCUSS/DSCN MKR DOCD: CPT | Performed by: INTERNAL MEDICINE

## 2023-10-18 PROCEDURE — 99214 OFFICE O/P EST MOD 30 MIN: CPT | Performed by: INTERNAL MEDICINE

## 2023-10-18 PROCEDURE — 3079F DIAST BP 80-89 MM HG: CPT | Performed by: INTERNAL MEDICINE

## 2023-10-18 PROCEDURE — G8399 PT W/DXA RESULTS DOCUMENT: HCPCS | Performed by: INTERNAL MEDICINE

## 2023-10-18 PROCEDURE — G8484 FLU IMMUNIZE NO ADMIN: HCPCS | Performed by: INTERNAL MEDICINE

## 2023-10-18 RX ORDER — FLUTICASONE FUROATE, UMECLIDINIUM BROMIDE AND VILANTEROL TRIFENATATE 100; 62.5; 25 UG/1; UG/1; UG/1
POWDER RESPIRATORY (INHALATION)
COMMUNITY
Start: 2023-10-04

## 2023-10-18 NOTE — TELEPHONE ENCOUNTER
I spoke to the pt's daughter. She lives out of town & will arrive a couple days before the procedure- 10/28/23. Procedure scheduled 10/31/23, She will remain out of work for 1 week -10 days to care for the pt. Pt will be unable to drive for 1 week post procedure. She also needs someone to stay with her for 5 pays post procedure in case she were to fall or have excessive bleeding to where someone could call 911. FMLA 10/30/23- 11/8/23 return to work 11/9/23.

## 2023-10-18 NOTE — TELEPHONE ENCOUNTER
FMLA paperwork for patient's daughter has been received. Pt's daughter requesting FMLA to care for pt post TAVR recovery. Pt's daughter lives out of state. FMLA for pt's daughter was discussed with and approved by Dr Karina Solorio for her to care for pt 7-10 days for TAVR procedure. Pt's daughter lives out of state and will come to [facility] to stay with patient. I contacted the pt's daughter Cece Pacheco) & left a message on her voicemail to call me back.

## 2023-10-18 NOTE — TELEPHONE ENCOUNTER
Pt daughter brought [facility] paperwork with her when pt came to appt today 10/18/2023.

## 2023-10-26 ENCOUNTER — TELEPHONE (OUTPATIENT)
Dept: CARDIOLOGY CLINIC | Age: 83
End: 2023-10-26

## 2023-10-26 ENCOUNTER — PREP FOR PROCEDURE (OUTPATIENT)
Age: 83
End: 2023-10-26

## 2023-10-26 DIAGNOSIS — I35.0 AORTIC VALVE STENOSIS, ETIOLOGY OF CARDIAC VALVE DISEASE UNSPECIFIED: Primary | ICD-10-CM

## 2023-10-26 DIAGNOSIS — Z01.818 PREOP TESTING: ICD-10-CM

## 2023-10-26 NOTE — TELEPHONE ENCOUNTER
I spoke to Ms. Samy Kaplan. She will be coming to town on Saturday instead of Monday prior to her mom's TAVR procedure. She is asking if we could change the date on her [place] paperwork to 10/28/23. I told her that will be fine. It appears the forms may have went to [employer]  have not been scanned. Pt's daughter will refax paperwork from the [facility] tomorrow am. I will fill out & fax back. She will fax to the Coumadin fax to my attention.

## 2023-10-27 NOTE — TELEPHONE ENCOUNTER
New paperwork received today & completed. Forms faxed to [employer] at both numbes listed on the form [phone] & [phone]. Fax confirmation was received for both numbers. The pt's daughter, Tree Cooper was notified. Copies were given to Miguel Goldberg in Medical Records to be scanned in the pt's chart.

## 2023-10-30 ENCOUNTER — HOSPITAL ENCOUNTER (OUTPATIENT)
Dept: SURGERY | Age: 83
Discharge: HOME | End: 2023-11-02
Payer: MEDICARE

## 2023-10-30 ENCOUNTER — HOSPITAL ENCOUNTER (OUTPATIENT)
Dept: LAB | Age: 83
Discharge: HOME | DRG: 267 | End: 2023-11-02
Payer: MEDICARE

## 2023-10-30 ENCOUNTER — ANESTHESIA EVENT (OUTPATIENT)
Dept: SURGERY | Age: 83
DRG: 267 | End: 2023-10-30
Payer: MEDICARE

## 2023-10-30 ENCOUNTER — HOSPITAL ENCOUNTER (OUTPATIENT)
Dept: GENERAL RADIOLOGY | Age: 83
Discharge: HOME | End: 2023-11-02
Payer: MEDICARE

## 2023-10-30 VITALS
DIASTOLIC BLOOD PRESSURE: 37 MMHG | SYSTOLIC BLOOD PRESSURE: 95 MMHG | BODY MASS INDEX: 28.03 KG/M2 | TEMPERATURE: 97.3 F | HEIGHT: 64 IN | HEART RATE: 57 BPM | WEIGHT: 164.2 LBS | OXYGEN SATURATION: 93 % | RESPIRATION RATE: 20 BRPM

## 2023-10-30 DIAGNOSIS — I35.0 AORTIC VALVE STENOSIS, ETIOLOGY OF CARDIAC VALVE DISEASE UNSPECIFIED: ICD-10-CM

## 2023-10-30 DIAGNOSIS — Z01.818 PREOP TESTING: ICD-10-CM

## 2023-10-30 LAB
ALBUMIN SERPL-MCNC: 3.4 G/DL (ref 3.2–4.6)
ALBUMIN/GLOB SERPL: 1 (ref 0.4–1.6)
ALP SERPL-CCNC: 63 U/L (ref 50–130)
ALT SERPL-CCNC: 17 U/L (ref 12–65)
ANION GAP SERPL CALC-SCNC: 6 MMOL/L (ref 2–11)
APPEARANCE UR: CLEAR
AST SERPL-CCNC: 16 U/L (ref 15–37)
BILIRUB SERPL-MCNC: 0.3 MG/DL (ref 0.2–1.1)
BILIRUB UR QL: NEGATIVE
BUN SERPL-MCNC: 35 MG/DL (ref 8–23)
CALCIUM SERPL-MCNC: 8.5 MG/DL (ref 8.3–10.4)
CHLORIDE SERPL-SCNC: 111 MMOL/L (ref 101–110)
CO2 SERPL-SCNC: 28 MMOL/L (ref 21–32)
COLOR UR: NORMAL
CREAT SERPL-MCNC: 1.12 MG/DL (ref 0.6–1)
EKG ATRIAL RATE: 57 BPM
EKG DIAGNOSIS: NORMAL
EKG P AXIS: 32 DEGREES
EKG P-R INTERVAL: 222 MS
EKG Q-T INTERVAL: 440 MS
EKG QRS DURATION: 84 MS
EKG QTC CALCULATION (BAZETT): 428 MS
EKG R AXIS: 0 DEGREES
EKG T AXIS: 66 DEGREES
EKG VENTRICULAR RATE: 57 BPM
ERYTHROCYTE [DISTWIDTH] IN BLOOD BY AUTOMATED COUNT: 14 % (ref 11.9–14.6)
EST. AVERAGE GLUCOSE BLD GHB EST-MCNC: 108 MG/DL
GLOBULIN SER CALC-MCNC: 3.3 G/DL (ref 2.8–4.5)
GLUCOSE SERPL-MCNC: 95 MG/DL (ref 65–100)
GLUCOSE UR STRIP.AUTO-MCNC: NEGATIVE MG/DL
HBA1C MFR BLD: 5.4 % (ref 4.8–5.6)
HCT VFR BLD AUTO: 33.8 % (ref 35.8–46.3)
HGB BLD-MCNC: 10.4 G/DL (ref 11.7–15.4)
HGB UR QL STRIP: NEGATIVE
HISTORY CHECK: NORMAL
INR PPP: 1
KETONES UR QL STRIP.AUTO: NEGATIVE MG/DL
LEUKOCYTE ESTERASE UR QL STRIP.AUTO: NEGATIVE
MAGNESIUM SERPL-MCNC: 2.1 MG/DL (ref 1.8–2.4)
MCH RBC QN AUTO: 28.8 PG (ref 26.1–32.9)
MCHC RBC AUTO-ENTMCNC: 30.8 G/DL (ref 31.4–35)
MCV RBC AUTO: 93.6 FL (ref 82–102)
MRSA DNA SPEC QL NAA+PROBE: NOT DETECTED
NITRITE UR QL STRIP.AUTO: NEGATIVE
NRBC # BLD: 0 K/UL (ref 0–0.2)
PH UR STRIP: 5.5 (ref 5–9)
PLATELET # BLD AUTO: 119 K/UL (ref 150–450)
PMV BLD AUTO: 10 FL (ref 9.4–12.3)
POTASSIUM SERPL-SCNC: 4.1 MMOL/L (ref 3.5–5.1)
PROT SERPL-MCNC: 6.7 G/DL (ref 6.3–8.2)
PROT UR STRIP-MCNC: NEGATIVE MG/DL
PROTHROMBIN TIME: 13.6 SEC (ref 12.6–14.3)
RBC # BLD AUTO: 3.61 M/UL (ref 4.05–5.2)
S AUREUS CPE NOSE QL NAA+PROBE: NOT DETECTED
SODIUM SERPL-SCNC: 145 MMOL/L (ref 133–143)
SP GR UR REFRACTOMETRY: 1.02 (ref 1–1.02)
UROBILINOGEN UR QL STRIP.AUTO: 0.2 EU/DL (ref 0.2–1)
WBC # BLD AUTO: 4.7 K/UL (ref 4.3–11.1)

## 2023-10-30 PROCEDURE — 87086 URINE CULTURE/COLONY COUNT: CPT

## 2023-10-30 PROCEDURE — 71046 X-RAY EXAM CHEST 2 VIEWS: CPT

## 2023-10-30 PROCEDURE — 86920 COMPATIBILITY TEST SPIN: CPT

## 2023-10-30 PROCEDURE — 81003 URINALYSIS AUTO W/O SCOPE: CPT

## 2023-10-30 PROCEDURE — 93005 ELECTROCARDIOGRAM TRACING: CPT

## 2023-10-30 PROCEDURE — 86850 RBC ANTIBODY SCREEN: CPT

## 2023-10-30 PROCEDURE — 85610 PROTHROMBIN TIME: CPT

## 2023-10-30 PROCEDURE — 86901 BLOOD TYPING SEROLOGIC RH(D): CPT

## 2023-10-30 PROCEDURE — 80053 COMPREHEN METABOLIC PANEL: CPT

## 2023-10-30 PROCEDURE — 87641 MR-STAPH DNA AMP PROBE: CPT

## 2023-10-30 PROCEDURE — 93010 ELECTROCARDIOGRAM REPORT: CPT | Performed by: INTERNAL MEDICINE

## 2023-10-30 PROCEDURE — 83735 ASSAY OF MAGNESIUM: CPT

## 2023-10-30 PROCEDURE — 85027 COMPLETE CBC AUTOMATED: CPT

## 2023-10-30 PROCEDURE — 86900 BLOOD TYPING SEROLOGIC ABO: CPT

## 2023-10-30 PROCEDURE — 83036 HEMOGLOBIN GLYCOSYLATED A1C: CPT

## 2023-10-30 RX ORDER — HYDROMORPHONE HYDROCHLORIDE 2 MG/ML
0.25 INJECTION, SOLUTION INTRAMUSCULAR; INTRAVENOUS; SUBCUTANEOUS EVERY 5 MIN PRN
Status: CANCELLED | OUTPATIENT
Start: 2023-10-30

## 2023-10-30 RX ORDER — SODIUM CHLORIDE, SODIUM LACTATE, POTASSIUM CHLORIDE, CALCIUM CHLORIDE 600; 310; 30; 20 MG/100ML; MG/100ML; MG/100ML; MG/100ML
INJECTION, SOLUTION INTRAVENOUS CONTINUOUS
Status: CANCELLED | OUTPATIENT
Start: 2023-10-30

## 2023-10-30 RX ORDER — OXYCODONE HYDROCHLORIDE 5 MG/1
5 TABLET ORAL
Status: CANCELLED | OUTPATIENT
Start: 2023-10-30 | End: 2023-10-31

## 2023-10-30 RX ORDER — ONDANSETRON 2 MG/ML
4 INJECTION INTRAMUSCULAR; INTRAVENOUS
Status: CANCELLED | OUTPATIENT
Start: 2023-10-30 | End: 2023-10-31

## 2023-10-30 RX ORDER — SODIUM CHLORIDE 9 MG/ML
INJECTION, SOLUTION INTRAVENOUS PRN
Status: CANCELLED | OUTPATIENT
Start: 2023-10-30

## 2023-10-30 RX ORDER — HALOPERIDOL 5 MG/ML
1 INJECTION INTRAMUSCULAR
Status: CANCELLED | OUTPATIENT
Start: 2023-10-30 | End: 2023-10-31

## 2023-10-30 RX ORDER — DEXTROSE MONOHYDRATE 100 MG/ML
INJECTION, SOLUTION INTRAVENOUS CONTINUOUS PRN
Status: CANCELLED | OUTPATIENT
Start: 2023-10-30

## 2023-10-30 RX ORDER — SODIUM CHLORIDE 0.9 % (FLUSH) 0.9 %
5-40 SYRINGE (ML) INJECTION PRN
Status: CANCELLED | OUTPATIENT
Start: 2023-10-30

## 2023-10-30 RX ORDER — ALBUTEROL SULFATE 90 UG/1
2 AEROSOL, METERED RESPIRATORY (INHALATION) EVERY 6 HOURS PRN
COMMUNITY

## 2023-10-30 RX ORDER — SODIUM CHLORIDE 0.9 % (FLUSH) 0.9 %
5-40 SYRINGE (ML) INJECTION EVERY 12 HOURS SCHEDULED
Status: CANCELLED | OUTPATIENT
Start: 2023-10-30

## 2023-10-30 NOTE — PROGRESS NOTES
Reviewed todays p.a.t. labs/test results-- ok for surg  per [facility] protocol-- abn has been sent routed to dr singh by lab as per protocol.  Urine cult and mrsa swab still in process

## 2023-10-30 NOTE — PROGRESS NOTES
PLEASE CONTINUE TAKING ALL PRESCRIPTION MEDICATIONS UP TO THE DAY OF SURGERY UNLESS OTHERWISE DIRECTED BELOW. You may take Tylenol, allergy,  and/or indigestion medications. TAKE ONLY THESE MEDICATIONS ON THE DAY OF SURGERY    Pepcid, singulair,  allegra, coreg and use trelegy and bring albuterol inhaler   PER note  on dtr phone from [facility] r.n.--pt to take asa 81 mg dos        DISCONTINUE all vitamins and supplements 7 days prior to surgery. DISCONTINUE Non-Steroidal Anti-Inflammatory (NSAIDS) such as Advil and Aleve 5 days prior to surgery. Home Medications to Hold- please continue all other medications except these. Hold eliquis x 3 days        Comments                 Please do not bring home medications with you on the day of surgery unless otherwise directed by your nurse. If you are instructed to bring home medications, please give them to your nurse as they will be administered by the nursing staff. If you have any questions, please call [address] [place] [address] [phone]. A copy of this note was provided to the patient for reference.

## 2023-10-30 NOTE — PROGRESS NOTES
Patient verified name and     Order for consent WAS found in EHR and matches case posting; patient verified. Type 3 surgery, WALK IN assessment complete. Labs per surgeon: MRSA, CXR, TYPE AND SCREEN, URINE CULT, URINE , PT/INR, MG, HA1C, CMP, CBC--- ALL COLLECTED TODAY AND SENT TO LAB--PT AND DTR SENT TO XRAY--ENTRANCE A-- WITH COPY OF ORDER IN HAND AND IN EPIC  LABS PER [facility] PROTOCOL-- NO ADD'T  EKG: TODAY-- ALSO PT HAS CATH  10/4/23 AND MARIE 23 REPORTS IN EPIC    OF NOTE--PT STATES WAS TOLD TO HOLD ELIQUIS 10/29/23, 10/30/23 AND 10/31/23--PT STATES SHE FORGOT AND TOOK 10/29/23 AM DOSE BUT NONE SINCE--CALLED Sandra Suárez R.N. -- [address] [place] [address] approved surgical skin cleanser and instructions given per hospital policy. Patient provided with and instructed on educational handouts including Guide to Surgery, Pain Management, Hand Hygiene, Blood Transfusion Education, and [facility] Brochure. Patient answered medical/surgical history questions at their best of ability. All prior to admission medications documented in [facility]. Original medication prescription bottle WAS NOT visualized during patient appointment. Patient instructed to hold all vitamins 7 days prior to surgery and NSAIDS 5 days prior to surgery, patient verbalized understanding. Patient teach back successful and patient demonstrates knowledge of instructions.

## 2023-10-31 ENCOUNTER — ANESTHESIA (OUTPATIENT)
Dept: SURGERY | Age: 83
DRG: 267 | End: 2023-10-31
Payer: MEDICARE

## 2023-10-31 ENCOUNTER — APPOINTMENT (OUTPATIENT)
Dept: NON INVASIVE DIAGNOSTICS | Age: 83
DRG: 267 | End: 2023-10-31
Attending: INTERNAL MEDICINE
Payer: MEDICARE

## 2023-10-31 ENCOUNTER — HOSPITAL ENCOUNTER (INPATIENT)
Age: 83
LOS: 1 days | Discharge: HOME | DRG: 267 | End: 2023-11-01
Attending: INTERNAL MEDICINE | Admitting: INTERNAL MEDICINE
Payer: MEDICARE

## 2023-10-31 DIAGNOSIS — Z95.2 S/P TAVR (TRANSCATHETER AORTIC VALVE REPLACEMENT): Chronic | ICD-10-CM

## 2023-10-31 DIAGNOSIS — I35.0 AORTIC VALVE STENOSIS, ETIOLOGY OF CARDIAC VALVE DISEASE UNSPECIFIED: ICD-10-CM

## 2023-10-31 DIAGNOSIS — I35.0 NONRHEUMATIC AORTIC VALVE STENOSIS: Primary | ICD-10-CM

## 2023-10-31 DIAGNOSIS — I35.0 AORTIC STENOSIS: ICD-10-CM

## 2023-10-31 DIAGNOSIS — Z01.818 PREOP TESTING: ICD-10-CM

## 2023-10-31 LAB
BASE DEFICIT BLD-SCNC: 0.1 MMOL/L
BASE DEFICIT BLD-SCNC: 0.4 MMOL/L
CA-I BLD-MCNC: 1.17 MMOL/L (ref 1.12–1.32)
CA-I BLD-MCNC: 1.19 MMOL/L (ref 1.12–1.32)
CO2 BLD-SCNC: 27 MMOL/L (ref 13–23)
CO2 BLD-SCNC: 27 MMOL/L (ref 13–23)
ECHO AO ROOT DIAM: 3 CM
ECHO AO ROOT INDEX: 1.67 CM/M2
ECHO AV MEAN GRADIENT: 7 MMHG
ECHO AV MEAN VELOCITY: 1.2 M/S
ECHO AV PEAK GRADIENT: 11 MMHG
ECHO AV PEAK VELOCITY: 1.6 M/S
ECHO AV VELOCITY RATIO: 0.56
ECHO AV VTI: 47.1 CM
ECHO BSA: 1.83 M2
ECHO LVOT AREA: 3.1 CM2
ECHO LVOT AV VTI INDEX: 0.49
ECHO LVOT DIAM: 2 CM
ECHO LVOT MEAN GRADIENT: 2 MMHG
ECHO LVOT PEAK GRADIENT: 3 MMHG
ECHO LVOT PEAK VELOCITY: 0.9 M/S
ECHO LVOT STROKE VOLUME INDEX: 40.5 ML/M2
ECHO LVOT SV: 72.8 ML
ECHO LVOT VTI: 23.2 CM
EKG ATRIAL RATE: 53 BPM
EKG DIAGNOSIS: NORMAL
EKG P AXIS: 24 DEGREES
EKG P-R INTERVAL: 272 MS
EKG Q-T INTERVAL: 482 MS
EKG QRS DURATION: 98 MS
EKG QTC CALCULATION (BAZETT): 452 MS
EKG R AXIS: -74 DEGREES
EKG T AXIS: 85 DEGREES
EKG VENTRICULAR RATE: 53 BPM
GLUCOSE BLD STRIP.AUTO-MCNC: 116 MG/DL (ref 65–100)
GLUCOSE BLD STRIP.AUTO-MCNC: 126 MG/DL (ref 65–100)
HCO3 BLD-SCNC: 26.7 MMOL/L (ref 22–26)
HCO3 BLD-SCNC: 26.9 MMOL/L (ref 22–26)
NT PRO BNP: 944 PG/ML
PCO2 BLD: 54 MMHG (ref 35–45)
PCO2 BLD: 54.6 MMHG (ref 35–45)
PH BLD: 7.3 (ref 7.35–7.45)
PH BLD: 7.3 (ref 7.35–7.45)
PO2 BLD: 263 MMHG (ref 75–100)
PO2 BLD: 268 MMHG (ref 75–100)
POTASSIUM BLD-SCNC: 4 MMOL/L (ref 3.5–5.1)
POTASSIUM BLD-SCNC: 4.2 MMOL/L (ref 3.5–5.1)
SAO2 % BLD: 100 %
SAO2 % BLD: 100 %
SERVICE CMNT-IMP: ABNORMAL
SERVICE CMNT-IMP: ABNORMAL
SODIUM BLD-SCNC: 144 MMOL/L (ref 136–145)
SODIUM BLD-SCNC: 145 MMOL/L (ref 136–145)
SPECIMEN SITE: ABNORMAL
SPECIMEN SITE: ABNORMAL

## 2023-10-31 PROCEDURE — 83880 ASSAY OF NATRIURETIC PEPTIDE: CPT

## 2023-10-31 PROCEDURE — 2720000010 HC SURG SUPPLY STERILE: Performed by: INTERNAL MEDICINE

## 2023-10-31 PROCEDURE — 2580000003 HC RX 258: Performed by: INTERNAL MEDICINE

## 2023-10-31 PROCEDURE — 2500000003 HC RX 250 WO HCPCS: Performed by: ANESTHESIOLOGY

## 2023-10-31 PROCEDURE — 2500000003 HC RX 250 WO HCPCS: Performed by: INTERNAL MEDICINE

## 2023-10-31 PROCEDURE — 2580000003 HC RX 258: Performed by: NURSE ANESTHETIST, CERTIFIED REGISTERED

## 2023-10-31 PROCEDURE — C1884 EMBOLIZATION PROTECT SYST: HCPCS | Performed by: INTERNAL MEDICINE

## 2023-10-31 PROCEDURE — C1760 CLOSURE DEV, VASC: HCPCS | Performed by: INTERNAL MEDICINE

## 2023-10-31 PROCEDURE — 6360000002 HC RX W HCPCS: Performed by: NURSE ANESTHETIST, CERTIFIED REGISTERED

## 2023-10-31 PROCEDURE — 33361 REPLACE AORTIC VALVE PERQ: CPT | Performed by: INTERNAL MEDICINE

## 2023-10-31 PROCEDURE — 3600000007 HC SURGERY HYBRID BASE: Performed by: INTERNAL MEDICINE

## 2023-10-31 PROCEDURE — B246ZZ4 ULTRASONOGRAPHY OF RIGHT AND LEFT HEART, TRANSESOPHAGEAL: ICD-10-PCS | Performed by: THORACIC SURGERY (CARDIOTHORACIC VASCULAR SURGERY)

## 2023-10-31 PROCEDURE — 2100000001 HC CVICU R&B

## 2023-10-31 PROCEDURE — 93010 ELECTROCARDIOGRAM REPORT: CPT | Performed by: INTERNAL MEDICINE

## 2023-10-31 PROCEDURE — 6360000002 HC RX W HCPCS: Performed by: INTERNAL MEDICINE

## 2023-10-31 PROCEDURE — 2500000003 HC RX 250 WO HCPCS: Performed by: NURSE ANESTHETIST, CERTIFIED REGISTERED

## 2023-10-31 PROCEDURE — 93005 ELECTROCARDIOGRAM TRACING: CPT | Performed by: INTERNAL MEDICINE

## 2023-10-31 PROCEDURE — 3600000017 HC SURGERY HYBRID ADDL 15MIN: Performed by: INTERNAL MEDICINE

## 2023-10-31 PROCEDURE — 33361 REPLACE AORTIC VALVE PERQ: CPT | Performed by: THORACIC SURGERY (CARDIOTHORACIC VASCULAR SURGERY)

## 2023-10-31 PROCEDURE — C1769 GUIDE WIRE: HCPCS | Performed by: INTERNAL MEDICINE

## 2023-10-31 PROCEDURE — 93321 DOPPLER ECHO F-UP/LMTD STD: CPT | Performed by: INTERNAL MEDICINE

## 2023-10-31 PROCEDURE — C1725 CATH, TRANSLUMIN NON-LASER: HCPCS | Performed by: INTERNAL MEDICINE

## 2023-10-31 PROCEDURE — 93308 TTE F-UP OR LMTD: CPT

## 2023-10-31 PROCEDURE — 3700000000 HC ANESTHESIA ATTENDED CARE: Performed by: INTERNAL MEDICINE

## 2023-10-31 PROCEDURE — 33370 TCAT PLMT&RMVL CEPD PERQ: CPT | Performed by: INTERNAL MEDICINE

## 2023-10-31 PROCEDURE — 84295 ASSAY OF SERUM SODIUM: CPT

## 2023-10-31 PROCEDURE — C1889 IMPLANT/INSERT DEVICE, NOC: HCPCS | Performed by: INTERNAL MEDICINE

## 2023-10-31 PROCEDURE — 94640 AIRWAY INHALATION TREATMENT: CPT

## 2023-10-31 PROCEDURE — 93325 DOPPLER ECHO COLOR FLOW MAPG: CPT | Performed by: INTERNAL MEDICINE

## 2023-10-31 PROCEDURE — 027F3ZZ DILATION OF AORTIC VALVE, PERCUTANEOUS APPROACH: ICD-10-PCS | Performed by: THORACIC SURGERY (CARDIOTHORACIC VASCULAR SURGERY)

## 2023-10-31 PROCEDURE — 6370000000 HC RX 637 (ALT 250 FOR IP): Performed by: INTERNAL MEDICINE

## 2023-10-31 PROCEDURE — C1894 INTRO/SHEATH, NON-LASER: HCPCS | Performed by: INTERNAL MEDICINE

## 2023-10-31 PROCEDURE — 82803 BLOOD GASES ANY COMBINATION: CPT

## 2023-10-31 PROCEDURE — 93308 TTE F-UP OR LMTD: CPT | Performed by: INTERNAL MEDICINE

## 2023-10-31 PROCEDURE — 02RF3JZ REPLACEMENT OF AORTIC VALVE WITH SYNTHETIC SUBSTITUTE, PERCUTANEOUS APPROACH: ICD-10-PCS | Performed by: THORACIC SURGERY (CARDIOTHORACIC VASCULAR SURGERY)

## 2023-10-31 PROCEDURE — 84132 ASSAY OF SERUM POTASSIUM: CPT

## 2023-10-31 PROCEDURE — 94664 DEMO&/EVAL PT USE INHALER: CPT

## 2023-10-31 PROCEDURE — 2709999900 HC NON-CHARGEABLE SUPPLY: Performed by: INTERNAL MEDICINE

## 2023-10-31 PROCEDURE — 3700000001 HC ADD 15 MINUTES (ANESTHESIA): Performed by: INTERNAL MEDICINE

## 2023-10-31 PROCEDURE — 6370000000 HC RX 637 (ALT 250 FOR IP): Performed by: ANESTHESIOLOGY

## 2023-10-31 PROCEDURE — 82947 ASSAY GLUCOSE BLOOD QUANT: CPT

## 2023-10-31 PROCEDURE — 82330 ASSAY OF CALCIUM: CPT

## 2023-10-31 PROCEDURE — 6360000002 HC RX W HCPCS: Performed by: THORACIC SURGERY (CARDIOTHORACIC VASCULAR SURGERY)

## 2023-10-31 DEVICE — VLV EVOLUTFX-26 COMM US
Type: IMPLANTABLE DEVICE | Site: AORTIC VALVE | Status: FUNCTIONAL
Brand: EVOLUT™ FX

## 2023-10-31 RX ORDER — ARFORMOTEROL TARTRATE 15 UG/2ML
15 SOLUTION RESPIRATORY (INHALATION)
Status: DISCONTINUED | OUTPATIENT
Start: 2023-10-31 | End: 2023-11-01 | Stop reason: HOSPADM

## 2023-10-31 RX ORDER — PROTAMINE SULFATE 10 MG/ML
INJECTION, SOLUTION INTRAVENOUS PRN
Status: DISCONTINUED | OUTPATIENT
Start: 2023-10-31 | End: 2023-10-31 | Stop reason: SDUPTHER

## 2023-10-31 RX ORDER — DEXMEDETOMIDINE HYDROCHLORIDE 100 UG/ML
INJECTION, SOLUTION INTRAVENOUS PRN
Status: DISCONTINUED | OUTPATIENT
Start: 2023-10-31 | End: 2023-10-31 | Stop reason: SDUPTHER

## 2023-10-31 RX ORDER — ASPIRIN 81 MG/1
81 TABLET, CHEWABLE ORAL DAILY
Status: DISCONTINUED | OUTPATIENT
Start: 2023-11-01 | End: 2023-11-01 | Stop reason: HOSPADM

## 2023-10-31 RX ORDER — ACETAMINOPHEN 325 MG/1
650 TABLET ORAL EVERY 4 HOURS PRN
Status: DISCONTINUED | OUTPATIENT
Start: 2023-10-31 | End: 2023-11-01 | Stop reason: HOSPADM

## 2023-10-31 RX ORDER — CARVEDILOL 3.12 MG/1
6.25 TABLET ORAL 2 TIMES DAILY
Status: DISCONTINUED | OUTPATIENT
Start: 2023-10-31 | End: 2023-11-01

## 2023-10-31 RX ORDER — ALBUTEROL SULFATE 90 UG/1
2 AEROSOL, METERED RESPIRATORY (INHALATION) EVERY 6 HOURS PRN
Status: DISCONTINUED | OUTPATIENT
Start: 2023-10-31 | End: 2023-11-01 | Stop reason: HOSPADM

## 2023-10-31 RX ORDER — PRAVASTATIN SODIUM 20 MG
40 TABLET ORAL NIGHTLY
Status: DISCONTINUED | OUTPATIENT
Start: 2023-10-31 | End: 2023-11-01 | Stop reason: HOSPADM

## 2023-10-31 RX ORDER — DEXMEDETOMIDINE HYDROCHLORIDE 4 UG/ML
INJECTION, SOLUTION INTRAVENOUS CONTINUOUS PRN
Status: DISCONTINUED | OUTPATIENT
Start: 2023-10-31 | End: 2023-10-31 | Stop reason: SDUPTHER

## 2023-10-31 RX ORDER — SODIUM CHLORIDE 9 MG/ML
INJECTION, SOLUTION INTRAVENOUS CONTINUOUS
Status: ACTIVE | OUTPATIENT
Start: 2023-10-31 | End: 2023-10-31

## 2023-10-31 RX ORDER — SODIUM CHLORIDE 0.9 % (FLUSH) 0.9 %
5-40 SYRINGE (ML) INJECTION EVERY 12 HOURS SCHEDULED
Status: DISCONTINUED | OUTPATIENT
Start: 2023-10-31 | End: 2023-11-01 | Stop reason: HOSPADM

## 2023-10-31 RX ORDER — SODIUM CHLORIDE 9 MG/ML
INJECTION, SOLUTION INTRAVENOUS PRN
Status: DISCONTINUED | OUTPATIENT
Start: 2023-10-31 | End: 2023-10-31 | Stop reason: HOSPADM

## 2023-10-31 RX ORDER — SODIUM CHLORIDE, SODIUM LACTATE, POTASSIUM CHLORIDE, CALCIUM CHLORIDE 600; 310; 30; 20 MG/100ML; MG/100ML; MG/100ML; MG/100ML
INJECTION, SOLUTION INTRAVENOUS CONTINUOUS
Status: DISCONTINUED | OUTPATIENT
Start: 2023-10-31 | End: 2023-10-31

## 2023-10-31 RX ORDER — SODIUM CHLORIDE 0.9 % (FLUSH) 0.9 %
5-40 SYRINGE (ML) INJECTION PRN
Status: DISCONTINUED | OUTPATIENT
Start: 2023-10-31 | End: 2023-11-01 | Stop reason: HOSPADM

## 2023-10-31 RX ORDER — SODIUM CHLORIDE 0.9 % (FLUSH) 0.9 %
5-40 SYRINGE (ML) INJECTION PRN
Status: DISCONTINUED | OUTPATIENT
Start: 2023-10-31 | End: 2023-10-31 | Stop reason: HOSPADM

## 2023-10-31 RX ORDER — SODIUM CHLORIDE 0.9 % (FLUSH) 0.9 %
5-40 SYRINGE (ML) INJECTION EVERY 12 HOURS SCHEDULED
Status: DISCONTINUED | OUTPATIENT
Start: 2023-10-31 | End: 2023-10-31 | Stop reason: HOSPADM

## 2023-10-31 RX ORDER — SODIUM CHLORIDE, SODIUM LACTATE, POTASSIUM CHLORIDE, CALCIUM CHLORIDE 600; 310; 30; 20 MG/100ML; MG/100ML; MG/100ML; MG/100ML
INJECTION, SOLUTION INTRAVENOUS CONTINUOUS PRN
Status: DISCONTINUED | OUTPATIENT
Start: 2023-10-31 | End: 2023-10-31 | Stop reason: SDUPTHER

## 2023-10-31 RX ORDER — NICARDIPINE HYDROCHLORIDE 0.1 MG/ML
INJECTION INTRAVENOUS PRN
Status: DISCONTINUED | OUTPATIENT
Start: 2023-10-31 | End: 2023-10-31 | Stop reason: SDUPTHER

## 2023-10-31 RX ORDER — PROPOFOL 10 MG/ML
INJECTION, EMULSION INTRAVENOUS PRN
Status: DISCONTINUED | OUTPATIENT
Start: 2023-10-31 | End: 2023-10-31 | Stop reason: SDUPTHER

## 2023-10-31 RX ORDER — ALBUTEROL SULFATE 2.5 MG/3ML
2.5 SOLUTION RESPIRATORY (INHALATION) EVERY 4 HOURS PRN
Status: DISCONTINUED | OUTPATIENT
Start: 2023-10-31 | End: 2023-11-01 | Stop reason: HOSPADM

## 2023-10-31 RX ORDER — ONDANSETRON 2 MG/ML
4 INJECTION INTRAMUSCULAR; INTRAVENOUS EVERY 6 HOURS PRN
Status: DISCONTINUED | OUTPATIENT
Start: 2023-10-31 | End: 2023-11-01 | Stop reason: HOSPADM

## 2023-10-31 RX ORDER — FAMOTIDINE 20 MG/1
20 TABLET, FILM COATED ORAL 2 TIMES DAILY
Status: DISCONTINUED | OUTPATIENT
Start: 2023-10-31 | End: 2023-10-31

## 2023-10-31 RX ORDER — MIDAZOLAM HYDROCHLORIDE 2 MG/2ML
2 INJECTION, SOLUTION INTRAMUSCULAR; INTRAVENOUS
Status: DISCONTINUED | OUTPATIENT
Start: 2023-10-31 | End: 2023-10-31 | Stop reason: HOSPADM

## 2023-10-31 RX ORDER — SODIUM CHLORIDE 9 MG/ML
INJECTION, SOLUTION INTRAVENOUS PRN
Status: DISCONTINUED | OUTPATIENT
Start: 2023-10-31 | End: 2023-11-01 | Stop reason: HOSPADM

## 2023-10-31 RX ORDER — LIDOCAINE HYDROCHLORIDE 10 MG/ML
INJECTION, SOLUTION INFILTRATION; PERINEURAL PRN
Status: DISCONTINUED | OUTPATIENT
Start: 2023-10-31 | End: 2023-10-31 | Stop reason: HOSPADM

## 2023-10-31 RX ORDER — SODIUM CHLORIDE 9 MG/ML
INJECTION, SOLUTION INTRAVENOUS CONTINUOUS
Status: DISCONTINUED | OUTPATIENT
Start: 2023-10-31 | End: 2023-10-31

## 2023-10-31 RX ORDER — MONTELUKAST SODIUM 10 MG/1
10 TABLET ORAL NIGHTLY
Status: DISCONTINUED | OUTPATIENT
Start: 2023-10-31 | End: 2023-11-01 | Stop reason: HOSPADM

## 2023-10-31 RX ORDER — HYDROCODONE BITARTRATE AND ACETAMINOPHEN 5; 325 MG/1; MG/1
1 TABLET ORAL EVERY 6 HOURS PRN
Status: DISCONTINUED | OUTPATIENT
Start: 2023-10-31 | End: 2023-11-01 | Stop reason: HOSPADM

## 2023-10-31 RX ORDER — LOSARTAN POTASSIUM 25 MG/1
25 TABLET ORAL DAILY
Status: DISCONTINUED | OUTPATIENT
Start: 2023-11-01 | End: 2023-11-01

## 2023-10-31 RX ORDER — BUDESONIDE 0.5 MG/2ML
0.5 INHALANT ORAL
Status: DISCONTINUED | OUTPATIENT
Start: 2023-10-31 | End: 2023-11-01 | Stop reason: HOSPADM

## 2023-10-31 RX ORDER — LIDOCAINE HYDROCHLORIDE 10 MG/ML
1 INJECTION, SOLUTION INFILTRATION; PERINEURAL
Status: COMPLETED | OUTPATIENT
Start: 2023-10-31 | End: 2023-10-31

## 2023-10-31 RX ORDER — MAGNESIUM HYDROXIDE/ALUMINUM HYDROXICE/SIMETHICONE 120; 1200; 1200 MG/30ML; MG/30ML; MG/30ML
15 SUSPENSION ORAL EVERY 6 HOURS PRN
Status: DISCONTINUED | OUTPATIENT
Start: 2023-10-31 | End: 2023-11-01 | Stop reason: HOSPADM

## 2023-10-31 RX ORDER — EPHEDRINE SULFATE/0.9% NACL/PF 50 MG/5 ML
SYRINGE (ML) INTRAVENOUS PRN
Status: DISCONTINUED | OUTPATIENT
Start: 2023-10-31 | End: 2023-10-31 | Stop reason: SDUPTHER

## 2023-10-31 RX ORDER — HEPARIN SODIUM 1000 [USP'U]/ML
INJECTION, SOLUTION INTRAVENOUS; SUBCUTANEOUS PRN
Status: DISCONTINUED | OUTPATIENT
Start: 2023-10-31 | End: 2023-10-31 | Stop reason: SDUPTHER

## 2023-10-31 RX ORDER — ACETAMINOPHEN 500 MG
1000 TABLET ORAL ONCE
Status: COMPLETED | OUTPATIENT
Start: 2023-10-31 | End: 2023-10-31

## 2023-10-31 RX ORDER — SERTRALINE HYDROCHLORIDE 100 MG/1
100 TABLET, FILM COATED ORAL NIGHTLY
Status: DISCONTINUED | OUTPATIENT
Start: 2023-10-31 | End: 2023-11-01 | Stop reason: HOSPADM

## 2023-10-31 RX ORDER — LIDOCAINE HYDROCHLORIDE 20 MG/ML
INJECTION, SOLUTION EPIDURAL; INFILTRATION; INTRACAUDAL; PERINEURAL PRN
Status: DISCONTINUED | OUTPATIENT
Start: 2023-10-31 | End: 2023-10-31 | Stop reason: SDUPTHER

## 2023-10-31 RX ORDER — FAMOTIDINE 20 MG/1
20 TABLET, FILM COATED ORAL DAILY
Status: DISCONTINUED | OUTPATIENT
Start: 2023-11-01 | End: 2023-11-01 | Stop reason: HOSPADM

## 2023-10-31 RX ADMIN — PROPOFOL 50 MCG/KG/MIN: 10 INJECTION, EMULSION INTRAVENOUS at 11:12

## 2023-10-31 RX ADMIN — ACETAMINOPHEN 1000 MG: 500 TABLET, FILM COATED ORAL at 10:18

## 2023-10-31 RX ADMIN — SODIUM CHLORIDE, PRESERVATIVE FREE 10 ML: 5 INJECTION INTRAVENOUS at 20:12

## 2023-10-31 RX ADMIN — PROPOFOL 30 MG: 10 INJECTION, EMULSION INTRAVENOUS at 11:05

## 2023-10-31 RX ADMIN — Medication 2000 MG: at 11:04

## 2023-10-31 RX ADMIN — PROTAMINE SULFATE 60 MG: 10 INJECTION, SOLUTION INTRAVENOUS at 12:06

## 2023-10-31 RX ADMIN — LIDOCAINE HYDROCHLORIDE 60 MG: 20 INJECTION, SOLUTION EPIDURAL; INFILTRATION; INTRACAUDAL; PERINEURAL at 11:40

## 2023-10-31 RX ADMIN — HEPARIN SODIUM 13000 UNITS: 1000 INJECTION, SOLUTION INTRAVENOUS; SUBCUTANEOUS at 11:29

## 2023-10-31 RX ADMIN — SODIUM CHLORIDE: 9 INJECTION, SOLUTION INTRAVENOUS at 09:59

## 2023-10-31 RX ADMIN — DEXMEDETOMIDINE HYDROCHLORIDE 1 MCG/KG/HR: 4 INJECTION, SOLUTION INTRAVENOUS at 10:55

## 2023-10-31 RX ADMIN — SODIUM CHLORIDE: 9 INJECTION, SOLUTION INTRAVENOUS at 13:52

## 2023-10-31 RX ADMIN — ACETAMINOPHEN 650 MG: 325 TABLET ORAL at 20:08

## 2023-10-31 RX ADMIN — BUDESONIDE 500 MCG: 0.5 INHALANT RESPIRATORY (INHALATION) at 20:06

## 2023-10-31 RX ADMIN — DEXMEDETOMIDINE 80 MCG: 100 INJECTION, SOLUTION, CONCENTRATE INTRAVENOUS at 10:51

## 2023-10-31 RX ADMIN — NICARDIPINE HYDROCHLORIDE 0.2 MG: 0.1 INJECTION INTRAVENOUS at 12:03

## 2023-10-31 RX ADMIN — PRAVASTATIN SODIUM 40 MG: 20 TABLET ORAL at 20:08

## 2023-10-31 RX ADMIN — ARFORMOTEROL TARTRATE 15 MCG: 15 SOLUTION RESPIRATORY (INHALATION) at 20:06

## 2023-10-31 RX ADMIN — SERTRALINE 100 MG: 100 TABLET, FILM COATED ORAL at 20:28

## 2023-10-31 RX ADMIN — CEFAZOLIN SODIUM 2000 MG: 100 INJECTION, POWDER, LYOPHILIZED, FOR SOLUTION INTRAVENOUS at 19:03

## 2023-10-31 RX ADMIN — Medication 5 MG: at 11:45

## 2023-10-31 RX ADMIN — IPRATROPIUM BROMIDE 0.5 MG: 0.5 SOLUTION RESPIRATORY (INHALATION) at 20:06

## 2023-10-31 RX ADMIN — SODIUM CHLORIDE, SODIUM LACTATE, POTASSIUM CHLORIDE, AND CALCIUM CHLORIDE: 600; 310; 30; 20 INJECTION, SOLUTION INTRAVENOUS at 10:51

## 2023-10-31 RX ADMIN — Medication 5 MG: at 11:43

## 2023-10-31 ASSESSMENT — ENCOUNTER SYMPTOMS: SHORTNESS OF BREATH: 1

## 2023-10-31 ASSESSMENT — PAIN SCALES - GENERAL
PAINLEVEL_OUTOF10: 0
PAINLEVEL_OUTOF10: 0
PAINLEVEL_OUTOF10: 3
PAINLEVEL_OUTOF10: 0
PAINLEVEL_OUTOF10: 0

## 2023-10-31 ASSESSMENT — PAIN - FUNCTIONAL ASSESSMENT
PAIN_FUNCTIONAL_ASSESSMENT: 0-10
PAIN_FUNCTIONAL_ASSESSMENT: ACTIVITIES ARE NOT PREVENTED

## 2023-10-31 ASSESSMENT — PAIN DESCRIPTION - DESCRIPTORS: DESCRIPTORS: ACHING

## 2023-10-31 ASSESSMENT — PAIN DESCRIPTION - ORIENTATION: ORIENTATION: MID;ANTERIOR

## 2023-10-31 ASSESSMENT — PAIN DESCRIPTION - LOCATION: LOCATION: HAND

## 2023-10-31 NOTE — ANESTHESIA PRE PROCEDURE
Department of Anesthesiology  Preprocedure Note       Name:  Baldev David   Age:  79 y.o.  :  1940                                          MRN:  [id number]         Date:  10/31/2023      Surgeon: Yuli Cunningham):  MD Lauren Santana MD    Procedure: Procedure(s):  Transcatheter aortic valve replacement  TRANSCATHETER AORTIC VALVE REPLACEMENT FEMORAL APPROACH    Medications prior to admission:   Prior to Admission medications    Medication Sig Start Date End Date Taking?  Authorizing Provider   Cholecalciferol (VITAMIN D3) 125 MCG (5000 UT) TABS Take 1 tablet by mouth daily    Malia Nur MD   biotin (YUMVS/VITAJOY) 2500 MCG CHEW chewable gummie Take 1 each by mouth daily    Malia Nur MD   albuterol sulfate HFA (VENTOLIN HFA) 108 (90 Base) MCG/ACT inhaler Inhale 2 puffs into the lungs every 6 hours as needed for Wheezing    Malia Nur MD   apixaban (ELIQUIS) 5 MG TABS tablet Take 1 tablet by mouth 2 times daily 10/19/23   Oddis Days, MD NGUYỄN ELLIPTA 100-62.5-25 MCG/ACT AEPB inhaler Inhale 1 puff into the lungs daily 10/4/23   Malia Nur MD   alendronate (FOSAMAX) 70 MG tablet Take 1 tablet by mouth every 7 days  Patient not taking: Reported on 10/30/2023    Malia Nur MD   carvedilol (COREG) 6.25 MG tablet Take 1 tablet by mouth 2 times daily 23is Days, MD   Fexofenadine HCl (ALLEGRA PO) Take 1 tablet by mouth daily    Malia Nur MD   losartan (COZAAR) 25 MG tablet Take 1 tablet by mouth daily TAKE ONE TABLET BY MOUTH ONE TIME DAILY 22 Days, MD   pravastatin (PRAVACHOL) 40 MG tablet Take 1 tablet by mouth daily  Patient taking differently: Take 1 tablet by mouth at bedtime 22 Days, MD   OXYGEN 2.5 lpm qhs    Automatic Reconciliation, Ar   albuterol (PROVENTIL) (2.5 MG/3ML) 0.083% nebulizer solution Inhale 3 mLs into the lungs every

## 2023-10-31 NOTE — ANESTHESIA PROCEDURE NOTES
Arterial Line:    An arterial line was placed using ultrasound guidance, in the OR for the following indication(s): continuous blood pressure monitoring and blood sampling needed. A 20 gauge (size), 1 and 3/8 inch (length), Angiocath (type) catheter was placed, Seldinger technique used, into the left radial artery, secured by Tegaderm. Anesthesia type: Local  Local infiltration: Injection    Events:  patient tolerated procedure well with no complications and EBL < 5mL. 10/31/2023 10:52 AM10/31/2023 10:55 AM  Resident/CRNA: VERONICA Alves CRNA  Performed: Resident/CRNA   Preanesthetic Checklist  Completed: patient identified, IV checked, site marked, risks and benefits discussed, surgical/procedural consents, equipment checked, pre-op evaluation, timeout performed, anesthesia consent given, oxygen available, monitors applied/VS acknowledged, fire risk safety assessment completed and verbalized and blood product R/B/A discussed and consented

## 2023-10-31 NOTE — PLAN OF CARE
Problem: Pain  Goal: Verbalizes/displays adequate comfort level or baseline comfort level  Outcome: Progressing  Flowsheets (Taken 10/31/2023 1227)  Verbalizes/displays adequate comfort level or baseline comfort level: Encourage patient to monitor pain and request assistance     Problem: Safety - Adult  Goal: Free from fall injury  Outcome: Progressing  Flowsheets (Taken 10/31/2023 1423)  Free From Fall Injury: Instruct family/caregiver on patient safety     Problem: Discharge Planning  Goal: Discharge to home or other facility with appropriate resources  Outcome: Progressing  Flowsheets (Taken 10/31/2023 1227)  Discharge to home or other facility with appropriate resources: Identify barriers to discharge with patient and caregiver     Problem: Skin/Tissue Integrity  Goal: Absence of new skin breakdown  Description: 1. Monitor for areas of redness and/or skin breakdown  2. Assess vascular access sites hourly  3. Every 4-6 hours minimum:  Change oxygen saturation probe site  4. Every 4-6 hours:  If on nasal continuous positive airway pressure, respiratory therapy assess nares and determine need for appliance change or resting period.   Outcome: Progressing

## 2023-11-01 ENCOUNTER — APPOINTMENT (OUTPATIENT)
Dept: NON INVASIVE DIAGNOSTICS | Age: 83
DRG: 267 | End: 2023-11-01
Attending: INTERNAL MEDICINE
Payer: MEDICARE

## 2023-11-01 VITALS
HEIGHT: 64 IN | RESPIRATION RATE: 24 BRPM | DIASTOLIC BLOOD PRESSURE: 70 MMHG | HEART RATE: 72 BPM | SYSTOLIC BLOOD PRESSURE: 155 MMHG | WEIGHT: 202.16 LBS | OXYGEN SATURATION: 94 % | BODY MASS INDEX: 34.51 KG/M2 | TEMPERATURE: 98.3 F

## 2023-11-01 PROBLEM — I10 ESSENTIAL HYPERTENSION: Status: ACTIVE | Noted: 2017-08-29

## 2023-11-01 PROBLEM — Z95.2 S/P TAVR (TRANSCATHETER AORTIC VALVE REPLACEMENT): Chronic | Status: ACTIVE | Noted: 2023-10-04

## 2023-11-01 LAB
ANION GAP SERPL CALC-SCNC: ABNORMAL MMOL/L (ref 2–11)
BACTERIA SPEC CULT: NORMAL
BUN SERPL-MCNC: 19 MG/DL (ref 8–23)
CALCIUM SERPL-MCNC: 7.7 MG/DL (ref 8.3–10.4)
CHLORIDE SERPL-SCNC: 114 MMOL/L (ref 101–110)
CO2 SERPL-SCNC: 31 MMOL/L (ref 21–32)
CREAT SERPL-MCNC: 0.6 MG/DL (ref 0.6–1)
ECHO AO ASC DIAM: 3 CM
ECHO AO ASCENDING AORTA INDEX: 1.53 CM/M2
ECHO AV ACCELERATION TIME: 58 MS
ECHO AV AREA PEAK VELOCITY: 1.8 CM2
ECHO AV AREA VTI: 2 CM2
ECHO AV AREA/BSA PEAK VELOCITY: 0.9 CM2/M2
ECHO AV AREA/BSA VTI: 1 CM2/M2
ECHO AV MEAN GRADIENT: 7 MMHG
ECHO AV MEAN VELOCITY: 1.3 M/S
ECHO AV PEAK GRADIENT: 13 MMHG
ECHO AV PEAK VELOCITY: 1.8 M/S
ECHO AV VELOCITY RATIO: 0.61
ECHO AV VTI: 41.3 CM
ECHO BSA: 1.83 M2
ECHO EST RA PRESSURE: 8 MMHG
ECHO IVC PROX: 2.6 CM
ECHO LA AREA 2C: 17.4 CM2
ECHO LA AREA 4C: 20 CM2
ECHO LA DIAMETER INDEX: 2.19 CM/M2
ECHO LA DIAMETER: 4.3 CM
ECHO LA MAJOR AXIS: 5.7 CM
ECHO LA MINOR AXIS: 5.1 CM
ECHO LA VOL 2C: 49 ML (ref 22–52)
ECHO LA VOL 4C: 59 ML (ref 22–52)
ECHO LA VOL BP: 57 ML (ref 22–52)
ECHO LA VOL/BSA BIPLANE: 29 ML/M2 (ref 16–34)
ECHO LA VOLUME INDEX A2C: 25 ML/M2 (ref 16–34)
ECHO LA VOLUME INDEX A4C: 30 ML/M2 (ref 16–34)
ECHO LV E' LATERAL VELOCITY: 12 CM/S
ECHO LV E' SEPTAL VELOCITY: 9 CM/S
ECHO LV EDV A2C: 98 ML
ECHO LV EDV A4C: 92 ML
ECHO LV EDV INDEX A4C: 47 ML/M2
ECHO LV EDV NDEX A2C: 50 ML/M2
ECHO LV EJECTION FRACTION A2C: 66 %
ECHO LV EJECTION FRACTION A4C: 63 %
ECHO LV EJECTION FRACTION BIPLANE: 65 % (ref 55–100)
ECHO LV ESV A2C: 33 ML
ECHO LV ESV A4C: 34 ML
ECHO LV ESV INDEX A2C: 17 ML/M2
ECHO LV ESV INDEX A4C: 17 ML/M2
ECHO LV FRACTIONAL SHORTENING: 41 % (ref 28–44)
ECHO LV INTERNAL DIMENSION DIASTOLE INDEX: 2.35 CM/M2
ECHO LV INTERNAL DIMENSION DIASTOLIC: 4.6 CM (ref 3.9–5.3)
ECHO LV INTERNAL DIMENSION SYSTOLIC INDEX: 1.38 CM/M2
ECHO LV INTERNAL DIMENSION SYSTOLIC: 2.7 CM
ECHO LV IVSD: 1.1 CM (ref 0.6–0.9)
ECHO LV MASS 2D: 181.2 G (ref 67–162)
ECHO LV MASS INDEX 2D: 92.5 G/M2 (ref 43–95)
ECHO LV POSTERIOR WALL DIASTOLIC: 1.1 CM (ref 0.6–0.9)
ECHO LV RELATIVE WALL THICKNESS RATIO: 0.48
ECHO LVOT AREA: 3.1 CM2
ECHO LVOT AV VTI INDEX: 0.63
ECHO LVOT DIAM: 2 CM
ECHO LVOT MEAN GRADIENT: 3 MMHG
ECHO LVOT PEAK GRADIENT: 4 MMHG
ECHO LVOT PEAK VELOCITY: 1.1 M/S
ECHO LVOT STROKE VOLUME INDEX: 41.5 ML/M2
ECHO LVOT SV: 81.3 ML
ECHO LVOT VTI: 25.9 CM
ECHO MV A VELOCITY: 1.3 M/S
ECHO MV AREA VTI: 1.9 CM2
ECHO MV E DECELERATION TIME (DT): 201 MS
ECHO MV E VELOCITY: 1.33 M/S
ECHO MV E/A RATIO: 1.02
ECHO MV E/E' LATERAL: 11.08
ECHO MV E/E' RATIO (AVERAGED): 12.93
ECHO MV E/E' SEPTAL: 14.78
ECHO MV LVOT VTI INDEX: 1.64
ECHO MV MAX VELOCITY: 1.4 M/S
ECHO MV MEAN GRADIENT: 4 MMHG
ECHO MV MEAN VELOCITY: 0.9 M/S
ECHO MV PEAK GRADIENT: 8 MMHG
ECHO MV VTI: 42.6 CM
ECHO PV ACCELERATION TIME (AT): 151 MS
ECHO PV MAX VELOCITY: 0.9 M/S
ECHO PV PEAK GRADIENT: 3 MMHG
ECHO RIGHT VENTRICULAR SYSTOLIC PRESSURE (RVSP): 42 MMHG
ECHO RV BASAL DIMENSION: 3.9 CM
ECHO RV FREE WALL PEAK S': 10 CM/S
ECHO RV TAPSE: 2 CM (ref 1.7–?)
ECHO TV REGURGITANT MAX VELOCITY: 2.93 M/S
ECHO TV REGURGITANT PEAK GRADIENT: 34 MMHG
EKG DIAGNOSIS: NORMAL
EKG Q-T INTERVAL: 424 MS
EKG QRS DURATION: 92 MS
EKG QTC CALCULATION (BAZETT): 444 MS
EKG R AXIS: -1 DEGREES
EKG T AXIS: 70 DEGREES
EKG VENTRICULAR RATE: 66 BPM
ERYTHROCYTE [DISTWIDTH] IN BLOOD BY AUTOMATED COUNT: 14.1 % (ref 11.9–14.6)
GLUCOSE SERPL-MCNC: 96 MG/DL (ref 65–100)
HCT VFR BLD AUTO: 30 % (ref 35.8–46.3)
HGB BLD-MCNC: 9.5 G/DL (ref 11.7–15.4)
MCH RBC QN AUTO: 30 PG (ref 26.1–32.9)
MCHC RBC AUTO-ENTMCNC: 31.7 G/DL (ref 31.4–35)
MCV RBC AUTO: 94.6 FL (ref 82–102)
NRBC # BLD: 0 K/UL (ref 0–0.2)
PLATELET # BLD AUTO: 94 K/UL (ref 150–450)
PMV BLD AUTO: 9.5 FL (ref 9.4–12.3)
POTASSIUM SERPL-SCNC: 4.1 MMOL/L (ref 3.5–5.1)
RBC # BLD AUTO: 3.17 M/UL (ref 4.05–5.2)
SERVICE CMNT-IMP: NORMAL
SODIUM SERPL-SCNC: 144 MMOL/L (ref 133–143)
WBC # BLD AUTO: 4.3 K/UL (ref 4.3–11.1)

## 2023-11-01 PROCEDURE — 93306 TTE W/DOPPLER COMPLETE: CPT | Performed by: INTERNAL MEDICINE

## 2023-11-01 PROCEDURE — 85027 COMPLETE CBC AUTOMATED: CPT

## 2023-11-01 PROCEDURE — 94760 N-INVAS EAR/PLS OXIMETRY 1: CPT

## 2023-11-01 PROCEDURE — 94640 AIRWAY INHALATION TREATMENT: CPT

## 2023-11-01 PROCEDURE — 6360000002 HC RX W HCPCS: Performed by: THORACIC SURGERY (CARDIOTHORACIC VASCULAR SURGERY)

## 2023-11-01 PROCEDURE — 2580000003 HC RX 258: Performed by: INTERNAL MEDICINE

## 2023-11-01 PROCEDURE — 2700000000 HC OXYGEN THERAPY PER DAY

## 2023-11-01 PROCEDURE — 6370000000 HC RX 637 (ALT 250 FOR IP): Performed by: INTERNAL MEDICINE

## 2023-11-01 PROCEDURE — 6360000002 HC RX W HCPCS: Performed by: INTERNAL MEDICINE

## 2023-11-01 PROCEDURE — 36415 COLL VENOUS BLD VENIPUNCTURE: CPT

## 2023-11-01 PROCEDURE — 6370000000 HC RX 637 (ALT 250 FOR IP): Performed by: THORACIC SURGERY (CARDIOTHORACIC VASCULAR SURGERY)

## 2023-11-01 PROCEDURE — 80048 BASIC METABOLIC PNL TOTAL CA: CPT

## 2023-11-01 PROCEDURE — 93306 TTE W/DOPPLER COMPLETE: CPT

## 2023-11-01 PROCEDURE — 93005 ELECTROCARDIOGRAM TRACING: CPT | Performed by: INTERNAL MEDICINE

## 2023-11-01 RX ORDER — CEFDINIR 300 MG/1
300 CAPSULE ORAL EVERY 12 HOURS SCHEDULED
Status: DISCONTINUED | OUTPATIENT
Start: 2023-11-01 | End: 2023-11-01

## 2023-11-01 RX ORDER — FERROUS SULFATE 325(65) MG
325 TABLET ORAL 2 TIMES DAILY WITH MEALS
Qty: 30 TABLET | Refills: 1 | Status: SHIPPED | OUTPATIENT
Start: 2023-11-01

## 2023-11-01 RX ORDER — FERROUS SULFATE 325(65) MG
325 TABLET ORAL 2 TIMES DAILY WITH MEALS
Status: DISCONTINUED | OUTPATIENT
Start: 2023-11-01 | End: 2023-11-01 | Stop reason: HOSPADM

## 2023-11-01 RX ORDER — LOSARTAN POTASSIUM 50 MG/1
50 TABLET ORAL DAILY
Status: DISCONTINUED | OUTPATIENT
Start: 2023-11-01 | End: 2023-11-01 | Stop reason: HOSPADM

## 2023-11-01 RX ORDER — LOSARTAN POTASSIUM 50 MG/1
50 TABLET ORAL DAILY
Qty: 30 TABLET | Refills: 3 | Status: SHIPPED | OUTPATIENT
Start: 2023-11-02

## 2023-11-01 RX ORDER — ASPIRIN 81 MG/1
81 TABLET, CHEWABLE ORAL DAILY
Qty: 30 TABLET | Refills: 3 | Status: SHIPPED | OUTPATIENT
Start: 2023-11-02

## 2023-11-01 RX ORDER — CEFPODOXIME PROXETIL 200 MG/1
200 TABLET, FILM COATED ORAL EVERY 12 HOURS SCHEDULED
Qty: 10 TABLET | Refills: 0 | Status: SHIPPED | OUTPATIENT
Start: 2023-11-01 | End: 2023-11-06

## 2023-11-01 RX ADMIN — CEFAZOLIN SODIUM 2000 MG: 100 INJECTION, POWDER, LYOPHILIZED, FOR SOLUTION INTRAVENOUS at 04:36

## 2023-11-01 RX ADMIN — IPRATROPIUM BROMIDE 0.5 MG: 0.5 SOLUTION RESPIRATORY (INHALATION) at 07:49

## 2023-11-01 RX ADMIN — ARFORMOTEROL TARTRATE 15 MCG: 15 SOLUTION RESPIRATORY (INHALATION) at 07:49

## 2023-11-01 RX ADMIN — ASPIRIN 81 MG: 81 TABLET, CHEWABLE ORAL at 08:38

## 2023-11-01 RX ADMIN — IPRATROPIUM BROMIDE 0.5 MG: 0.5 SOLUTION RESPIRATORY (INHALATION) at 10:51

## 2023-11-01 RX ADMIN — FAMOTIDINE 20 MG: 20 TABLET, FILM COATED ORAL at 08:39

## 2023-11-01 RX ADMIN — SODIUM CHLORIDE, PRESERVATIVE FREE 10 ML: 5 INJECTION INTRAVENOUS at 08:39

## 2023-11-01 RX ADMIN — LOSARTAN POTASSIUM 50 MG: 50 TABLET, FILM COATED ORAL at 08:39

## 2023-11-01 RX ADMIN — FERROUS SULFATE TAB 325 MG (65 MG ELEMENTAL FE) 325 MG: 325 (65 FE) TAB at 08:39

## 2023-11-01 RX ADMIN — APIXABAN 5 MG: 5 TABLET, FILM COATED ORAL at 08:38

## 2023-11-01 RX ADMIN — BUDESONIDE 500 MCG: 0.5 INHALANT RESPIRATORY (INHALATION) at 07:49

## 2023-11-01 ASSESSMENT — PAIN SCALES - GENERAL: PAINLEVEL_OUTOF10: 0

## 2023-11-01 NOTE — ANESTHESIA POSTPROCEDURE EVALUATION
Department of Anesthesiology  Postprocedure Note    Patient: Jennifer Cary  MRN: [id number]  YOB: 1940  Date of evaluation: 10/31/2023      Procedure Summary     Date: 10/31/23 Room / Location: [facility] MAIN OR  / [facility] MAIN OR    Anesthesia Start: 0362 Anesthesia Stop: 0390    Procedure: Transcatheter aortic valve replacement (Chest) Diagnosis:       Aortic valve stenosis, etiology of cardiac valve disease unspecified      (Aortic stenosis.)    Providers: Lm Daniels MD Responsible Provider: Andrez Bowser MD    Anesthesia Type: TIVA ASA Status: 4          Anesthesia Type: TIVA    Tanya Phase I: Tanya Score: 9    Tanya Phase II:        Anesthesia Post Evaluation    Patient location during evaluation: PACU  Patient participation: complete - patient participated  Level of consciousness: awake and alert  Airway patency: patent  Nausea: well controlled. Complications: no  Cardiovascular status: acceptable.   Respiratory status: acceptable  Hydration status: stable  Pain management: adequate

## 2023-11-01 NOTE — PROGRESS NOTES
A follow up visit was made to the patient. Emotional support, spiritual presence were provided for the patient. The patient shared that she was being discharged today. Her daughter was present.       Radha Devlin, [address], GILBERTO Brunson
[facility] CARDIOLOGY PROGRESS NOTE           11/1/2023 7:17 AM    Admit Date: 10/31/2023      Subjective:   Patient is resting comfortably in bed. Patient denies chest pain, shortness of breath, orthopnea or PND. She has walked the hallway without difficulty. ROS:  Cardiovascular:  As noted above    Objective:      Vitals:    11/01/23 0512 11/01/23 0530 11/01/23 0600 11/01/23 0622   BP:   (!) 158/67    Pulse:  52 65 64   Resp:  12 12 23   Temp:       TempSrc:       SpO2:  99% 99% 94%   Weight: 91.7 kg (202 lb 2.6 oz)      Height:           Physical Exam:  General-No Acute Distress  Neck- supple, no JVD  CV- regular rate and rhythm no MRG  Lung- clear bilaterally  Abd- soft, nontender, nondistended  Ext- no edema bilaterally. Skin- warm and dry    Data Review:   Recent Labs     10/30/23  0913 11/01/23  0435   * 144*   K 4.1 4.1   MG 2.1  --    BUN 35* 19   WBC 4.7 4.3   HGB 10.4* 9.5*   HCT 33.8* 30.0*   * 94*   INR 1.0  --        Assessment/Plan:     Principal Problem:    S/P TAVR (transcatheter aortic valve replacement)  Plan: Patient is status post valve in valve TAVR. Patient is chronically anticoagulated with Eliquis due to atrial fibrillation. We will continue Eliquis and no aspirin. Active Problems:    High cholesterol  Plan: Continue pravastatin    Essential hypertension  Plan: Discontinue carvedilol due to intermittent moderate bradycardia. Patient reports bradycardic episodes at home. Increase losartan to 50 mg daily. COPD, severe ([address])  Plan: Chronic and stable    Check echocardiogram today. Patient has already been ambulating. If echocardiogram demonstrates stable aortic valve, patient will be stable for discharge later today.         Yael Hansen MD  11/1/2023 7:17 AM
Cardiac Rehab: Spoke with patient regarding referral to cardiac rehab. Patient meets admission criteria based on TAVR (10/31/23). Written information about Cardiac Rehab given and reviewed with patient. Discussed lifestyle modifications to promote cardiac wellness. Patient indicated that she wants to participate in the cardiac rehab program and her orientation appt has been scheduled for the [address] [place] [address] program. Her Cardiologist is Dr. Gayle Kaur.       Thank you,  POORNIMA Fernando, RN  Cardiopulmonary Rehabilitation Nurse Liaison  [facility]
Discharge instructions, prescriptions, and follow up appt information given to and reviewed with patient. Pt stable at time of d/c. Opportunity for questions provided, pt voiced understanding. All pt belongings taken with pt from room. Pt transported to discharge area via wheelchair, daughter driving pt home.
PT was in bed and resting d/t current medical regime. [address] [id number] [facility] offered comforting spiritual presence. PT is [employer].  quietly offered prayer for PT, PT's Family, and Staff. Rev. LEVON Garzon.Div.   
TRANSFER - IN REPORT:    Verbal report received from Abel Dubois CRNA on [facility]  being received from [facility] for routine progression of patient care      Report consisted of patient's Situation, Background, Assessment and   Recommendations(SBAR). Information from the following report(s) Nurse Handoff Report, Surgery Report, Recent Results, and Cardiac Rhythm SB  was reviewed with the receiving nurse. Opportunity for questions and clarification was provided. Assessment completed upon patient's arrival to unit and care assumed.
Drains: None                Complications: None    Counts: Sponge and needle counts were correct times two.     Signed By:  Rosendo Robles MD     October 31, 2023

## 2023-11-02 ENCOUNTER — TELEPHONE (OUTPATIENT)
Age: 83
End: 2023-11-02

## 2023-11-02 NOTE — TELEPHONE ENCOUNTER
Care Transitions Initial Follow Up Call    Call within 2 business days of discharge: Yes     Patient: Prasanna Rocha Patient : 1940 MRN: [id number]    RARS: Readmission Risk Score: 11.3       Spoke with: patient    Discharge department/facility: [facility]    Non-face-to-face services provided:  Education of patient/family/caregiver/guardian to support self-management-site care, lifting precautions, cardiac prudent diet, f/u appt. Verb understanding.     Follow Up  Future Appointments   Date Time Provider [address]   2023  2:15 PM Soumya Munguia MD [facility] AMB   11/15/2023 12:30 PM Mal Burns RN [facility]   12/15/2023  9:30 AM [facility] ECHO 26 [facility] AMB   12/15/2023 11:00 AM Soumya Munguia MD [facility] AMB   2024  1:30 PM Harmeet Arroyo MD [facility]       Alberto Perales, GABRIELA

## 2023-11-03 DIAGNOSIS — Z95.2 S/P TAVR (TRANSCATHETER AORTIC VALVE REPLACEMENT): Primary | Chronic | ICD-10-CM

## 2023-11-03 LAB
ABO + RH BLD: NORMAL
BLD PROD TYP BPU: NORMAL
BLOOD BANK DISPENSE STATUS: NORMAL
BLOOD GROUP ANTIBODIES SERPL: NORMAL
BPU ID: NORMAL
CROSSMATCH RESULT: NORMAL
SPECIMEN EXP DATE BLD: NORMAL
UNIT DIVISION: 0

## 2023-11-06 ENCOUNTER — OFFICE VISIT (OUTPATIENT)
Age: 83
End: 2023-11-06

## 2023-11-06 VITALS
SYSTOLIC BLOOD PRESSURE: 130 MMHG | WEIGHT: 165.4 LBS | DIASTOLIC BLOOD PRESSURE: 64 MMHG | HEIGHT: 64 IN | HEART RATE: 62 BPM | BODY MASS INDEX: 28.24 KG/M2

## 2023-11-06 DIAGNOSIS — I10 ESSENTIAL HYPERTENSION: ICD-10-CM

## 2023-11-06 DIAGNOSIS — Z95.2 S/P TAVR (TRANSCATHETER AORTIC VALVE REPLACEMENT): Primary | Chronic | ICD-10-CM

## 2023-11-06 DIAGNOSIS — I48.0 PAROXYSMAL ATRIAL FIBRILLATION (HCC): ICD-10-CM

## 2023-11-06 PROBLEM — I48.91 ATRIAL FIBRILLATION (HCC): Status: ACTIVE | Noted: 2023-11-06

## 2023-11-06 PROBLEM — I35.0 AORTIC VALVE STENOSIS, ETIOLOGY OF CARDIAC VALVE DISEASE UNSPECIFIED: Status: RESOLVED | Noted: 2023-10-31 | Resolved: 2023-11-06

## 2023-11-06 ASSESSMENT — ENCOUNTER SYMPTOMS: SHORTNESS OF BREATH: 0

## 2023-11-06 NOTE — PROGRESS NOTES
problems and test results were reviewed with the patient today. Lab Results   Component Value Date    WBC 4.3 11/01/2023    HGB 9.5 (L) 11/01/2023    HCT 30.0 (L) 11/01/2023    MCV 94.6 11/01/2023    PLT 94 (L) 11/01/2023       Lab Results   Component Value Date/Time     11/01/2023 04:35 AM    K 4.1 11/01/2023 04:35 AM     11/01/2023 04:35 AM    CO2 31 11/01/2023 04:35 AM    BUN 19 11/01/2023 04:35 AM    CREATININE 0.60 11/01/2023 04:35 AM    GLUCOSE 96 11/01/2023 04:35 AM    CALCIUM 7.7 11/01/2023 04:35 AM        Lab Results   Component Value Date    CHOL 207 (H) 07/28/2020     Lab Results   Component Value Date    TRIG 59 07/28/2020     Lab Results   Component Value Date    HDL 92 07/28/2020     Lab Results   Component Value Date    LDLCALC 103 (H) 07/28/2020     Lab Results   Component Value Date    LABVLDL 12 07/28/2020     No results found for: \"CHOLHDLRATIO\"     Data from outside records/labs from outside providers have been reviewed and summarized as noted in the HPI, past history and data review sections of this note       ASSESSMENT and PLAN      1. Paroxysmal atrial fibrillation (HCC)  In sinus rhythm today. On Eliquis. Stop aspirin as noted below. 2. S/P TAVR (transcatheter aortic valve replacement)  Patient doing very well after valve in valve transcatheter aortic valve replacement. Stop aspirin after 1 month. Continue Eliquis. Check echo at upcoming visit in December. 3. Essential hypertension  Blood pressure well controlled. Continue losartan. No follow-ups on file. Marilyn Correia MD  11/6/2023  2:56 PM    This note may have been dictated using speech recognition software.   As a result, error of speech recognition may have occurred

## 2023-11-11 NOTE — PROGRESS NOTES
Pt sitting up in bed. TR band in place without swelling or bleeding noted. Denies pain or discomfort. Gave pt meal and drink. No other needs noted at this time. [facility]

## 2023-12-14 NOTE — PROGRESS NOTES
functioning bioprosthetic valve on echocardiogram.  We discussed the plan to  repeat echo at 1 year after implantation. We also discussed the need for antibiotic prophylaxis prior to any dental work. Patient will follow up with their primary cardiologist at this point. We will arrange follow-up appointment in 3 months. I would happy to assist in future care if needed. Stop Aspirin and continue Eliquis. 2. Paroxysmal atrial fibrillation (HCC)  In sinus rhythm. Continue Eliquis. 3. Essential hypertension  Currently BP appears to be under acceptable control on current therapy. Continue current medications including Losartan. Discussed monitoring ambulatory BP readings to ensure continued optimal management. If BP becomes elevated, patient is encouraged to contact my office for further titration of therapy. 4. High cholesterol  Continue Pravastatin. Return for Return at next scheduled office. Yassine Arias MD  12/15/2023  10:50 AM    This note may have been dictated using speech recognition software.   As a result, error of speech recognition may have occurred

## 2023-12-15 ENCOUNTER — OFFICE VISIT (OUTPATIENT)
Age: 83
End: 2023-12-15

## 2023-12-15 VITALS
WEIGHT: 165 LBS | BODY MASS INDEX: 28.17 KG/M2 | HEART RATE: 63 BPM | SYSTOLIC BLOOD PRESSURE: 150 MMHG | DIASTOLIC BLOOD PRESSURE: 62 MMHG | HEIGHT: 64 IN

## 2023-12-15 DIAGNOSIS — I48.0 PAROXYSMAL ATRIAL FIBRILLATION (HCC): ICD-10-CM

## 2023-12-15 DIAGNOSIS — E78.00 HIGH CHOLESTEROL: ICD-10-CM

## 2023-12-15 DIAGNOSIS — Z95.2 S/P TAVR (TRANSCATHETER AORTIC VALVE REPLACEMENT): Primary | Chronic | ICD-10-CM

## 2023-12-15 DIAGNOSIS — I10 ESSENTIAL HYPERTENSION: ICD-10-CM

## 2023-12-15 ASSESSMENT — KANSAS CITY CARDIOMYOPATHY QUESTIONNAIRE (KCCQ12)
1B. OVER THE PAST 2 WEEKS, HOW MUCH WERE YOU LIMITED BY HEART FAILURE SYMPTOMS (SHORTNESS OF BREATH OR FATIGUE) WHEN WALKING 1 BLOCK ON LEVEL GROUND: 5
3. OVER THE PAST 2 WEEKS, ON AVERAGE, HOW MANY TIMES HAS FATIGUE LIMITED YOUR ABILITY TO DO WHAT YOU WANTED: 6
8C. OVER THE PAST 2 WEEKS, ON AVERAGE, HOW HAS HEART FAILURE LIMITED YOU ABILITY TO VISIT FAMILY AND FRIENDS OUR OF YOUR HOME: 5
7. IF YOU HAD TO SPEND THE REST OF YOUR LIFE WITH YOUR HEART FAILURE THE WAY IT IS RIGHT NOW, HOW WOULD YOU FEEL ABOUT THIS?: 5
8B. OVER THE PAST 2 WEEKS, ON AVERAGE, HOW HAS HEART FAILURE LIMITED YOU ABILITY TO WORK OR DO HOUSEHOLD CHORES: 5
6. OVER THE PAST 2 WEEKS, HOW MUCH HAS YOUR HEART FAILURE LIMITED YOUR ENJOYMENT OF LIFE: 5
2. OVER THE PAST 2 WEEKS, HOW MANY TIMES DID YOU HAVE SWELLING IN YOUR FEET, ANKLES OR LEGS WHEN YOU WOKE UP IN THE MORNING: 5
8A. OVER THE PAST 2 WEEKS, ON AVERAGE, HOW HAS HEART FAILURE LIMITED YOU ABILITY TO DO HOBBIES OR RECREATIONAL ACTIVITIES: 5
1C. OVER THE PAST 2 WEEKS, HOW MUCH WERE YOU LIMITED BY HEART FAILURE SYMPTOMS (SHORTNESS OF BREATH OR FATIGUE) WHEN HURRYING OR JOGGING (AS IF TO CATCH A BUS): 1
5. OVER THE PAST 2 WEEKS, ON AVERAGE, HOW MANY TIMES HAVE YOU BEEN FORCED TO SLEEP SITTING UP IN A CHAIR OR WITH AT LEAST 3 PILLOWS TO PROP YOU UP BECAUSE OF SHORTNESS OF BREATH?: 5
TOTAL SCORE: 59
4. OVER THE PAST 2 WEEKS, ON AVERAGE, HOW MANY TIMES HAS SHORTNESS OF BREATH LIMITED YOUR ABILITY TO DO WHAT YOU WANTED: 7
1A. OVER THE PAST 2 WEEKS, HOW MUCH WERE YOU LIMITED BY HEART FAILURE SYMPTOMS (SHORTNESS OF BREATH OR FATIGUE) WHEN SHOWERING OR BATHING: 5

## 2024-01-30 NOTE — TELEPHONE ENCOUNTER
Requested Prescriptions     Pending Prescriptions Disp Refills    apixaban (ELIQUIS) 5 MG TABS tablet 180 tablet 3     Sig: Take 1 tablet by mouth 2 times daily     Verified rx in last OV date 08/15/2023. Pharmacy confirmed. Erx as requested

## 2024-02-02 ENCOUNTER — TELEPHONE (OUTPATIENT)
Age: 84
End: 2024-02-02

## 2024-02-02 NOTE — TELEPHONE ENCOUNTER
----- Message from Bernadette Tran MA sent at 2/2/2024  1:48 PM EST -----  Regarding: FW: Eliquis Supply  Contact: [phone]    ----- Message -----  From: Bernice Blanco \"Pat\"  Sent: 2/2/2024   1:43 PM EST  To: [facility] Cardiology Clinical Staff  Subject: Eliquis Supply                                   Please let me know if you have a sample of Eliquis which I could  due to the fact that the prescription you provided to [facility] is being processed but it will not be received by me prior to my being without the medication.   Many thanks.   Tamiko Blanco

## 2024-02-02 NOTE — TELEPHONE ENCOUNTER
2 boxes of Eliquis 2.5mg samples dispensed and waiting for pt at the .  Called pt to let her know and she voiced understanding.

## 2024-02-19 NOTE — PROGRESS NOTES
[address], [address]  [place] [address]  PHONE: [phone]      24    NAME:  Bernice Blanco  : 1940  MRN: [id number]         SUBJECTIVE:   Bernice Blanco is a 83 y.o. female seen for a follow up visit regarding the following:     Chief Complaint   Patient presents with    6 Month Follow-Up    Hypertension    Atrial Fibrillation            HPI:  Follow up  6 Month Follow-Up, Hypertension, and Atrial Fibrillation   .    Follow up bioprosthetic AVR with progressive stenosis, h/o orthostatic hypotension,afib. 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  Now post TAVR with good result by most recent echo.  She is feeling quite a bit better, much less dyspneic than previous.      BP had been elevated at last PCP visit, he planned to increase her losartan but she became wary having had OH before so has only been taking the 25 mg dose, BP has remained well controlled.           Past cardiac history:   Bioprosthetic 2015 - EF 60-65%, abnormal DF, normal AVR, RVSP - normal SF, DF, normal AVR, RVSP 32  2022- EF 60-65%, abn DF, AVR with possible stenosis, peak gradient 3.4 m/s, mean gradient 27, RVSP 38, mild to mod MR  2023        EF 60-65%, mild LVH, Abn DF, mean AV gradient 31, peak velocity 3.8 m/s (23 mm Guzman pericardial AVR), mild/mod MR, RVSP 26  Aug 2023       EF 63%, mild LVH, abn DF, E/e' 21, severe prosthetic valve AS, mean 40 peak velocity 3.9 m/s mild MR/TR, RVSP 51  Oct 2023       26 mm Medtronic Evolut FX TAVR  Dec 2023       EF 60-65%, mild LVH, abn DF, mean AV gradient 7, mild MR/TR, SHADI                  Key CAD CHF Meds            losartan (COZAAR) 25 MG tablet (Taking)    Class: Historical Med    apixaban (ELIQUIS) 5 MG TABS tablet (Taking)    Sig - Route: Take 1 tablet by mouth 2 times daily - Oral    Notes to Pharmacy: Faxed to [facility] [email or web address]) [phone].    pravastatin (PRAVACHOL) 40 MG tablet

## 2024-02-20 ENCOUNTER — OFFICE VISIT (OUTPATIENT)
Age: 84
End: 2024-02-20
Payer: MEDICARE

## 2024-02-20 VITALS
HEIGHT: 64 IN | SYSTOLIC BLOOD PRESSURE: 138 MMHG | BODY MASS INDEX: 27.83 KG/M2 | WEIGHT: 163 LBS | HEART RATE: 68 BPM | DIASTOLIC BLOOD PRESSURE: 78 MMHG

## 2024-02-20 DIAGNOSIS — I10 ESSENTIAL HYPERTENSION: ICD-10-CM

## 2024-02-20 DIAGNOSIS — I48.0 PAROXYSMAL ATRIAL FIBRILLATION (HCC): ICD-10-CM

## 2024-02-20 DIAGNOSIS — Z95.2 S/P TAVR (TRANSCATHETER AORTIC VALVE REPLACEMENT): Primary | Chronic | ICD-10-CM

## 2024-02-20 PROCEDURE — 1090F PRES/ABSN URINE INCON ASSESS: CPT | Performed by: INTERNAL MEDICINE

## 2024-02-20 PROCEDURE — 1036F TOBACCO NON-USER: CPT | Performed by: INTERNAL MEDICINE

## 2024-02-20 PROCEDURE — G8417 CALC BMI ABV UP PARAM F/U: HCPCS | Performed by: INTERNAL MEDICINE

## 2024-02-20 PROCEDURE — 99214 OFFICE O/P EST MOD 30 MIN: CPT | Performed by: INTERNAL MEDICINE

## 2024-02-20 PROCEDURE — G8484 FLU IMMUNIZE NO ADMIN: HCPCS | Performed by: INTERNAL MEDICINE

## 2024-02-20 PROCEDURE — 1123F ACP DISCUSS/DSCN MKR DOCD: CPT | Performed by: INTERNAL MEDICINE

## 2024-02-20 PROCEDURE — G8399 PT W/DXA RESULTS DOCUMENT: HCPCS | Performed by: INTERNAL MEDICINE

## 2024-02-20 PROCEDURE — G8427 DOCREV CUR MEDS BY ELIG CLIN: HCPCS | Performed by: INTERNAL MEDICINE

## 2024-02-20 PROCEDURE — 3075F SYST BP GE 130 - 139MM HG: CPT | Performed by: INTERNAL MEDICINE

## 2024-02-20 PROCEDURE — 3078F DIAST BP <80 MM HG: CPT | Performed by: INTERNAL MEDICINE

## 2024-02-20 RX ORDER — LOSARTAN POTASSIUM 25 MG/1
25 TABLET ORAL DAILY
Qty: 90 TABLET | Refills: 1 | COMMUNITY
Start: 2024-02-20

## 2024-02-20 ASSESSMENT — ENCOUNTER SYMPTOMS: SHORTNESS OF BREATH: 0

## 2024-07-18 ENCOUNTER — TELEPHONE (OUTPATIENT)
Age: 84
End: 2024-07-18

## 2024-07-18 NOTE — TELEPHONE ENCOUNTER
So sorry to hear this.  Ok to hold it for 2 days, then resume until gyn completes evaluation. Expect some bleeding but shouldn't be heavy.  If they need to interrupt it 2-3 days around procedures, this is low risk.  If bleeding continues to be a severe issue, let us know.

## 2024-07-18 NOTE — TELEPHONE ENCOUNTER
Patients daughter called stating her mom has the following concerns :    Doctors visit yesterday for vaginal bleeding  A polyp was removed  Experienced heavy bleeding   Has tapered off today but still bleeding  Question : Should patient hold her (ELIQUIS) 5 MG TABS tablet ?

## 2024-07-18 NOTE — TELEPHONE ENCOUNTER
[facility]  [address] [address]  [phone]          April 9, 2019    RE:  Kelly Nevarez                                                                                                                                                       [address]   [facility] [address]            To whom it may concern:    Please excuse Kelly from gym and any exercise from today, 4/9/2018 through 4/23/2019 due to moderate left ankle sprain.   Please also allow her to use crutches for ambulation until her pain significantly improves.      Sincerely,        Jose Hsieh MD     Pt.daughter reports pt.had been having vaginal bleeding for a month.Had a polyp removed at OB/GYN yesterday.There was a lot of bleeding.Pt.held her Eliquis last night and is still having some bleeding.She is also having some fluttering in her heart/Afib 25% of time on apple watch.She was diagnosed w/uterine cancer yesterday.Has oncology visit tomorrow.Should she hold Eliquis?

## 2024-09-05 PROBLEM — C54.1 ENDOMETRIAL CANCER (HCC): Status: ACTIVE | Noted: 2024-09-05

## 2024-10-07 ENCOUNTER — OFFICE VISIT (OUTPATIENT)
Age: 84
End: 2024-10-07
Payer: MEDICARE

## 2024-10-07 VITALS
BODY MASS INDEX: 29.88 KG/M2 | DIASTOLIC BLOOD PRESSURE: 80 MMHG | HEIGHT: 64 IN | HEART RATE: 76 BPM | WEIGHT: 175 LBS | SYSTOLIC BLOOD PRESSURE: 148 MMHG

## 2024-10-07 DIAGNOSIS — C54.1 ENDOMETRIAL CANCER (HCC): ICD-10-CM

## 2024-10-07 DIAGNOSIS — I48.0 PAROXYSMAL ATRIAL FIBRILLATION (HCC): ICD-10-CM

## 2024-10-07 DIAGNOSIS — I10 ESSENTIAL HYPERTENSION: ICD-10-CM

## 2024-10-07 DIAGNOSIS — Z95.2 S/P TAVR (TRANSCATHETER AORTIC VALVE REPLACEMENT): Primary | Chronic | ICD-10-CM

## 2024-10-07 PROCEDURE — 1123F ACP DISCUSS/DSCN MKR DOCD: CPT | Performed by: INTERNAL MEDICINE

## 2024-10-07 PROCEDURE — 1090F PRES/ABSN URINE INCON ASSESS: CPT | Performed by: INTERNAL MEDICINE

## 2024-10-07 PROCEDURE — 3079F DIAST BP 80-89 MM HG: CPT | Performed by: INTERNAL MEDICINE

## 2024-10-07 PROCEDURE — G8427 DOCREV CUR MEDS BY ELIG CLIN: HCPCS | Performed by: INTERNAL MEDICINE

## 2024-10-07 PROCEDURE — 1036F TOBACCO NON-USER: CPT | Performed by: INTERNAL MEDICINE

## 2024-10-07 PROCEDURE — 3077F SYST BP >= 140 MM HG: CPT | Performed by: INTERNAL MEDICINE

## 2024-10-07 PROCEDURE — G8399 PT W/DXA RESULTS DOCUMENT: HCPCS | Performed by: INTERNAL MEDICINE

## 2024-10-07 PROCEDURE — 99214 OFFICE O/P EST MOD 30 MIN: CPT | Performed by: INTERNAL MEDICINE

## 2024-10-07 PROCEDURE — G8484 FLU IMMUNIZE NO ADMIN: HCPCS | Performed by: INTERNAL MEDICINE

## 2024-10-07 PROCEDURE — G8417 CALC BMI ABV UP PARAM F/U: HCPCS | Performed by: INTERNAL MEDICINE

## 2024-10-07 RX ORDER — ONDANSETRON 4 MG/1
4 TABLET, FILM COATED ORAL 3 TIMES DAILY PRN
COMMUNITY
Start: 2024-08-08

## 2024-10-07 RX ORDER — PROCHLORPERAZINE MALEATE 10 MG
10 TABLET ORAL 3 TIMES DAILY PRN
COMMUNITY
Start: 2024-08-08

## 2024-10-07 ASSESSMENT — ENCOUNTER SYMPTOMS: SHORTNESS OF BREATH: 0

## 2024-10-25 ENCOUNTER — TELEPHONE (OUTPATIENT)
Age: 84
End: 2024-10-25

## 2024-10-25 NOTE — TELEPHONE ENCOUNTER
Cardiac Clearance        Physician or Practice Requesting:[facility]   : sergei lopesjosh   Contact Phone Number: [phone]  Fax Number: [phone]  Date of Surgery/Procedure: smith sleeve   Type of Surgery or Procedure: 11/5/24  Type of Anesthesia: general    Type of Clearance Requested: Medication Hold Only  Medication to Hold:eliquis   Days to Hold: ? Needs a call not sure

## 2024-10-29 ENCOUNTER — TELEPHONE (OUTPATIENT)
Age: 84
End: 2024-10-29

## 2024-10-29 NOTE — TELEPHONE ENCOUNTER
Called s/w pt.  Pt reports her Apple Watch alerted her to 42% Afib for the week to Dr. Tanner.  States her afib is pretty much under control and was surprised by this.  Denies  palpitations, fluttering, skipping, racing or CP.  Some dizziness, fatigue and SOB w/exertion. Unsure what her HR has been although pt checked HR on O2 sat monitor while on phone w/nurse HR-72.    Pt states that her blood count was low and had to have a blood transfusion this past Saturday and wonders if symptoms are from the low blood ct.   I explained to pt that I would send above info to Dr. Mcqueen and call her back as well as call Cristal (oncology) as to whether this would have an impact on her upcoming procedure.

## 2024-10-29 NOTE — TELEPHONE ENCOUNTER
Per Cristal calling from [facility]. November 5th pt is having a procedure and they need to know if she can stop the medication Eliquis. The pt is stating she is having more Afib and the watch is saying she is having Afib. The office is asking to hear something by today.

## 2024-10-29 NOTE — TELEPHONE ENCOUNTER
Called pt back. Informed pt's daughter, Jayshree, and pt (on speaker) of Dr. Mcqueen's response.  She verb understanding.   Pt denies any bleeding. Denies  nose bleeds, rectal bleeding, hematuria, bleeding gums.  Jayshree states the chemotherapy has cause the pt's Hgb to be low thus the blood transfusion.    Pt is scheduled to have a brachy therapy Nov 5.  ( Oncology is wanting to know if the afib will have an impact on upcoming procedure)   Noted card cl/med hold from Oct 25 telephone encounter,  low risk to how Eliquis  2-3 days.  Any changes?

## 2024-10-29 NOTE — TELEPHONE ENCOUNTER
Afib begets afib.  It is not surprising that she has it frequently, and for most afib patients it will eventually become a permanent rhythm, which is fine as long as the rate is controlled and a stroke is prevented.  I think the more pressing question is why is she requiring transfusions?  If she is bleeding from somewhere we need to get her assessed for Watchman.  And, yes, anemia will cause palpitations and dyspnea.

## 2024-10-29 NOTE — TELEPHONE ENCOUNTER
Called s/w Cristal.  Pt is scheduled for Cornelio sleeve procedure  Nov 5.  Reports  pt told Dr. Tanner she was having increased afib.Wanted to know if this will have an impact on her upcoming procedure?   Card luther/Med hold, Eliquis,  was faxed to [facility] Oct 25.    Called pt/ LMOM to call triage nurse.

## 2024-10-30 NOTE — TELEPHONE ENCOUNTER
Called/LMOM for Cristal ([facility])to call triage nurse to follow up on faxed (10/25) med hold approval from our office.  Will refax if needed.

## 2024-10-30 NOTE — TELEPHONE ENCOUNTER
Olivia Arroyo MD Patterson, Marilynn Y, LPN7 hours ago (7:33 AM)     BM  Thank you, all remains same as prior instructions. thanks   I notified Jayshree of MD response and she v/u.

## 2024-11-21 NOTE — PROGRESS NOTES
[facility] CARDIOLOGY  [address] [place] [address], [address]  [place] [address]  PHONE: [phone]      24    NAME:  Bernice Blanco  : 1940  MRN: [id number]         SUBJECTIVE:   Bernice Blanco is a 84 y.o. female seen for a follow up visit regarding the following:     Chief Complaint   Patient presents with    Follow-up     S/P TAVR (transcatheter aortic valve replacement)            HPI:  Follow up  Follow-up (S/P TAVR (transcatheter aortic valve replacement))   .    Follow up bioprosthetic AVR with progressive stenosis, h/o orthostatic hypotension,afib. 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  Now post TAVR endometrial cancer, radiation and chemo. She was in the hospital at [facility] for a few days with acute on chronic respiratory failure with both COPD and HFpEF exacerbations.  Responded to nebulizer and diuretic.  Daily weights at home have been stable.  She was discharged on low dose metoprolol, they having stopped losartan d/t DAO on CKD but since discharge BP has been low.  Echo was unremarkable.  She's currently on a 4 day burst of lasix for ongoing dyspnea and wheezing but feels it is improving         Past cardiac history:   Bioprosthetic   Afib - auto rate control, anticoagulated   2019 - EF 60-65%, abnormal DF, normal AVR, RVSP 32   2021- normal SF, DF, normal AVR, RVSP 32  2022- EF 60-65%, abn DF, AVR with possible stenosis, peak gradient 3.4 m/s, mean gradient 27, RVSP 38, mild to mod MR  2023        EF 60-65%, mild LVH, Abn DF, mean AV gradient 31, peak velocity 3.8 m/s (23 mm Guzman pericardial AVR), mild/mod MR, RVSP 26  Aug 2023       EF 63%, mild LVH, abn DF, E/e' 21, severe prosthetic valve AS, mean 40 peak velocity 3.9 m/s mild MR/TR, RVSP 51  Oct 2023       26 mm Medtronic Evolut FX TAVR  Dec 2023       EF 60-65%, mild LVH, abn DF, mean AV gradient 7, mild MR/TR, SHADI  2024       [facility] Echo - EF 60-65%, abn DF, normal AVR, MV gradient 6 but

## 2024-11-22 ENCOUNTER — OFFICE VISIT (OUTPATIENT)
Age: 84
End: 2024-11-22

## 2024-11-22 VITALS
BODY MASS INDEX: 28.38 KG/M2 | HEIGHT: 64 IN | WEIGHT: 166.2 LBS | SYSTOLIC BLOOD PRESSURE: 112 MMHG | HEART RATE: 76 BPM | DIASTOLIC BLOOD PRESSURE: 54 MMHG

## 2024-11-22 DIAGNOSIS — I48.0 PAROXYSMAL ATRIAL FIBRILLATION (HCC): ICD-10-CM

## 2024-11-22 DIAGNOSIS — C54.1 ENDOMETRIAL CANCER (HCC): ICD-10-CM

## 2024-11-22 DIAGNOSIS — I10 ESSENTIAL HYPERTENSION: ICD-10-CM

## 2024-11-22 DIAGNOSIS — Z95.2 S/P TAVR (TRANSCATHETER AORTIC VALVE REPLACEMENT): Primary | Chronic | ICD-10-CM

## 2024-11-22 RX ORDER — METOPROLOL SUCCINATE 25 MG/1
12.5 TABLET, EXTENDED RELEASE ORAL DAILY
COMMUNITY
Start: 2024-11-17 | End: 2024-11-22 | Stop reason: SINTOL

## 2024-11-22 RX ORDER — FUROSEMIDE 20 MG/1
20 TABLET ORAL DAILY PRN
COMMUNITY
Start: 2024-11-22

## 2024-11-22 ASSESSMENT — ENCOUNTER SYMPTOMS
SHORTNESS OF BREATH: 1
WHEEZING: 1

## 2024-11-22 NOTE — PATIENT INSTRUCTIONS
1. Weigh daily after arising from bed and emptying your bladder  2. Keep a diary of your daily weights  3. Notify me if your weight increases by 2 lbs in 24 hours or 5 lbs in one week, as this may be an early indicator of increased fluid.

## 2025-02-06 ENCOUNTER — TELEPHONE (OUTPATIENT)
Age: 85
End: 2025-02-06

## 2025-02-06 NOTE — TELEPHONE ENCOUNTER
Requesting Eliquis samples til mail order comes in. Please call patient today because patient is totally out.

## 2025-04-24 ENCOUNTER — RESULTS FOLLOW-UP (OUTPATIENT)
Age: 85
End: 2025-04-24

## 2025-04-25 NOTE — PROGRESS NOTES
[facility] CARDIOLOGY  [address] [place] [address], [address]  [place] [address]  PHONE: [phone]      25    NAME:  Bernice Blanco  : 1940  MRN: [id number]         SUBJECTIVE:   Bernice Blanco is a 84 y.o. female seen for a follow up visit regarding the following:     Chief Complaint   Patient presents with    Follow-up    Hypertension    Atrial Fibrillation            HPI:  Follow up  Follow-up, Hypertension, and Atrial Fibrillation   .    Follow up bioprosthetic AVR with progressive stenosis, h/o orthostatic hypotension,afib. 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 Post TAVR. Endometrial cancer, radiation and chemo. Echo looks well.  She had her hysterectomy and says her results were free of cancer.  Her only complaint is feeling tired from time to time since her surgery in 2025.  She's been eating well overall though her refrigerator went kaput recently so the last week has been more convenience foods, getting that fixed.  Her allergies have been bothersome, has some wheezing but no fever and feels pretty much like her usual allergy.        ACE/ARB ON HOLD D/T DAO       Past cardiac history:   Bioprosthetic   Afib - auto rate control, anticoagulated   2019 - EF 60-65%, abnormal DF, normal AVR, RVSP 32   2021- normal SF, DF, normal AVR, RVSP 32  2022- EF 60-65%, abn DF, AVR with possible stenosis, peak gradient 3.4 m/s, mean gradient 27, RVSP 38, mild to mod MR  2023        EF 60-65%, mild LVH, Abn DF, mean AV gradient 31, peak velocity 3.8 m/s (23 mm Guzman pericardial AVR), mild/mod MR, RVSP 26  Aug 2023       EF 63%, mild LVH, abn DF, E/e' 21, severe prosthetic valve AS, mean 40 peak velocity 3.9 m/s mild MR/TR, RVSP 51  Oct 2023       26 mm Medtronic Evolut FX TAVR  Dec 2023       EF 60-65%, mild LVH, abn DF, mean AV gradient 7, mild MR/TR, SHADI  2024       [facility] Echo - EF 60-65%, abn DF, normal AVR, MV gradient 6 but heart rate not reported  Apr

## 2025-04-28 ENCOUNTER — OFFICE VISIT (OUTPATIENT)
Age: 85
End: 2025-04-28
Payer: MEDICARE

## 2025-04-28 VITALS
WEIGHT: 159 LBS | SYSTOLIC BLOOD PRESSURE: 144 MMHG | BODY MASS INDEX: 27.14 KG/M2 | HEIGHT: 64 IN | DIASTOLIC BLOOD PRESSURE: 72 MMHG | HEART RATE: 68 BPM

## 2025-04-28 DIAGNOSIS — Z95.2 S/P TAVR (TRANSCATHETER AORTIC VALVE REPLACEMENT): Primary | Chronic | ICD-10-CM

## 2025-04-28 DIAGNOSIS — I10 ESSENTIAL HYPERTENSION: ICD-10-CM

## 2025-04-28 DIAGNOSIS — C54.1 ENDOMETRIAL CANCER (HCC): ICD-10-CM

## 2025-04-28 DIAGNOSIS — I48.0 PAROXYSMAL ATRIAL FIBRILLATION (HCC): ICD-10-CM

## 2025-04-28 DIAGNOSIS — J44.9 COPD, SEVERE (HCC): ICD-10-CM

## 2025-04-28 DIAGNOSIS — I87.2 CHRONIC VENOUS STASIS DERMATITIS OF BOTH LOWER EXTREMITIES: ICD-10-CM

## 2025-04-28 PROCEDURE — 1123F ACP DISCUSS/DSCN MKR DOCD: CPT | Performed by: INTERNAL MEDICINE

## 2025-04-28 PROCEDURE — 99214 OFFICE O/P EST MOD 30 MIN: CPT | Performed by: INTERNAL MEDICINE

## 2025-04-28 PROCEDURE — 93000 ELECTROCARDIOGRAM COMPLETE: CPT | Performed by: INTERNAL MEDICINE

## 2025-04-28 PROCEDURE — 1126F AMNT PAIN NOTED NONE PRSNT: CPT | Performed by: INTERNAL MEDICINE

## 2025-04-28 PROCEDURE — 3077F SYST BP >= 140 MM HG: CPT | Performed by: INTERNAL MEDICINE

## 2025-04-28 PROCEDURE — 1159F MED LIST DOCD IN RCRD: CPT | Performed by: INTERNAL MEDICINE

## 2025-04-28 PROCEDURE — 3078F DIAST BP <80 MM HG: CPT | Performed by: INTERNAL MEDICINE

## 2025-04-28 PROCEDURE — 1160F RVW MEDS BY RX/DR IN RCRD: CPT | Performed by: INTERNAL MEDICINE

## 2025-04-28 ASSESSMENT — ENCOUNTER SYMPTOMS
WHEEZING: 1
COUGH: 0
SPUTUM PRODUCTION: 0
SHORTNESS OF BREATH: 0

## 2025-08-11 ENCOUNTER — PATIENT MESSAGE (OUTPATIENT)
Age: 85
End: 2025-08-11

## (undated) DEVICE — GUIDEWIRE 035IN 210CM PTFE COAT FIX COR J TIP 15MM FIRM BODY

## (undated) DEVICE — GUIDEWIRE VASC L260CM DIA0.035IN RAD 3MM J TIP L7CM PTFE

## (undated) DEVICE — NO DESCRIPTION: Brand: SENTINEL

## (undated) DEVICE — Device

## (undated) DEVICE — GLOVE SURG SZ 7 L11.33IN FNGR THK9.8MIL STRW LTX POLYMER

## (undated) DEVICE — GUIDE NDL ST W/ 14 X 147CM TELESCOPICALLY FLD CIV FLX CVR

## (undated) DEVICE — GUIDEWIRE VASC L260CM DIA0.035IN BRECKER FOR COREVLV

## (undated) DEVICE — GLIDESHEATH SLENDER STAINLESS STEEL KIT: Brand: GLIDESHEATH SLENDER

## (undated) DEVICE — GUIDEWIRE VASC L260CM DIA0.035IN TAPR L11CM FLPY TIP L4CM

## (undated) DEVICE — GUIDEWIRE VASC L150CM DIA0.035IN FLX TIP L7CM PTFE STR FIX

## (undated) DEVICE — PINNACLE INTRODUCER SHEATH: Brand: PINNACLE

## (undated) DEVICE — CATHETER DIAG AD 5FR L110CM 145DEG COR GRY HYDRPHLC NYL

## (undated) DEVICE — CATHETER COR DIAG PIGTAILS PIG 145 CRV 5FR 110CM 6 SIDE H

## (undated) DEVICE — CATHETER DIAG AD 6FR L100CM 0.038IN COR POLYUR INT MAMM

## (undated) DEVICE — BAND COMPR L21CM SHT CLR PLAS HEMSTAT EXT HK AND LOOP RETEN

## (undated) DEVICE — CATHETER DIAG AD 6FR L110CM 0.038IN COR POLYUR PGTL W/ 6

## (undated) DEVICE — COVER,TABLE,HEAVY DUTY,79"X110",STRL: Brand: MEDLINE

## (undated) DEVICE — 12 FOOT DISPOSABLE EXTENSION CABLE WITH SAFE CONNECT / SCREW-DOWN

## (undated) DEVICE — CATHETER ANGIO INFIN 038IN AMPLATZ 534545T

## (undated) DEVICE — TRUE™ DILATATION BALLOON VALVULOPLASTY CATHETER, 20 MM X 4.5 CM, 110 CM CATHETER: Brand: TRUE DILATATION

## (undated) DEVICE — PRO*PADZ SOLID GEL RADIOLUCENT MULTI-FUNCTION ELECTRODES - 1 PAIR: Brand: PRO PADZ

## (undated) DEVICE — BAND COMPR L24CM REG CLR PLAS HEMSTAT EXT HK AND LOOP RETEN

## (undated) DEVICE — ANGIO-SEAL VIP VASCULAR CLOSURE DEVICE: Brand: ANGIO-SEAL

## (undated) DEVICE — MICROPUNCTURE INTRODUCER SET SILHOUETTE TRANSITIONLESS WITH NITINOL WIRE GUIDE: Brand: MICROPUNCTURE

## (undated) DEVICE — RADIFOCUS OPTITORQUE ANGIOGRAPHIC CATHETER: Brand: OPTITORQUE

## (undated) DEVICE — CHECK-FLO PERFORMER INTRODUCER: Brand: PERFORMER

## (undated) DEVICE — PINNACLE TIF INTRODUCER SHEATH: Brand: PINNACLE

## (undated) DEVICE — PERCLOSE™ PROSTYLE™ SUTURE-MEDIATED CLOSURE AND REPAIR SYSTEM: Brand: PERCLOSE™ PROSTYLE™

## (undated) DEVICE — SHEATH INTRO L12CM DIA6FR W/ LUERLOCK HUB HEMSTAS VLV

## (undated) DEVICE — GUIDEWIRE WITH ICE™ HYDROPHILIC COATING: Brand: CHOICE™